# Patient Record
Sex: MALE | Race: WHITE | Employment: OTHER | URBAN - METROPOLITAN AREA
[De-identification: names, ages, dates, MRNs, and addresses within clinical notes are randomized per-mention and may not be internally consistent; named-entity substitution may affect disease eponyms.]

---

## 2017-01-16 RX ORDER — PRAVASTATIN SODIUM 40 MG
TABLET ORAL
Qty: 30 TABLET | Refills: 2 | Status: SHIPPED | OUTPATIENT
Start: 2017-01-16 | End: 2017-05-13 | Stop reason: SDUPTHER

## 2017-03-13 DIAGNOSIS — I10 ESSENTIAL HYPERTENSION: ICD-10-CM

## 2017-03-14 RX ORDER — LISINOPRIL 20 MG/1
TABLET ORAL
Qty: 30 TABLET | Refills: 2 | Status: SHIPPED | OUTPATIENT
Start: 2017-03-14 | End: 2017-06-21 | Stop reason: SDUPTHER

## 2017-04-24 ENCOUNTER — OFFICE VISIT (OUTPATIENT)
Dept: CARDIOLOGY CLINIC | Age: 62
End: 2017-04-24

## 2017-04-24 VITALS
HEIGHT: 64 IN | SYSTOLIC BLOOD PRESSURE: 128 MMHG | RESPIRATION RATE: 16 BRPM | HEART RATE: 76 BPM | BODY MASS INDEX: 26.43 KG/M2 | DIASTOLIC BLOOD PRESSURE: 64 MMHG | WEIGHT: 154.8 LBS

## 2017-04-24 DIAGNOSIS — E78.00 PURE HYPERCHOLESTEROLEMIA: ICD-10-CM

## 2017-04-24 DIAGNOSIS — I25.10 CORONARY ARTERY DISEASE INVOLVING NATIVE CORONARY ARTERY OF NATIVE HEART, ANGINA PRESENCE UNSPECIFIED: ICD-10-CM

## 2017-04-24 DIAGNOSIS — I10 ESSENTIAL HYPERTENSION: Primary | ICD-10-CM

## 2017-04-24 PROCEDURE — 99213 OFFICE O/P EST LOW 20 MIN: CPT | Performed by: INTERNAL MEDICINE

## 2017-04-25 DIAGNOSIS — I25.10 CORONARY ARTERY DISEASE INVOLVING NATIVE CORONARY ARTERY OF NATIVE HEART, ANGINA PRESENCE UNSPECIFIED: ICD-10-CM

## 2017-04-25 DIAGNOSIS — I10 ESSENTIAL HYPERTENSION: ICD-10-CM

## 2017-04-25 LAB
ALBUMIN SERPL-MCNC: 4.7 G/DL (ref 3.4–5)
ALP BLD-CCNC: 41 U/L (ref 40–129)
ALT SERPL-CCNC: 17 U/L (ref 10–40)
AST SERPL-CCNC: 28 U/L (ref 15–37)
BILIRUB SERPL-MCNC: 0.4 MG/DL (ref 0–1)
BILIRUBIN DIRECT: <0.2 MG/DL (ref 0–0.3)
BILIRUBIN, INDIRECT: NORMAL MG/DL (ref 0–1)
CHOLESTEROL, TOTAL: 144 MG/DL (ref 0–199)
HDLC SERPL-MCNC: 83 MG/DL (ref 40–60)
LDL CHOLESTEROL CALCULATED: 51 MG/DL
TOTAL CK: 83 U/L (ref 39–308)
TOTAL PROTEIN: 6.4 G/DL (ref 6.4–8.2)
TRIGL SERPL-MCNC: 48 MG/DL (ref 0–150)
VLDLC SERPL CALC-MCNC: 10 MG/DL

## 2017-05-16 RX ORDER — PRAVASTATIN SODIUM 40 MG
TABLET ORAL
Qty: 30 TABLET | Refills: 2 | Status: SHIPPED | OUTPATIENT
Start: 2017-05-16 | End: 2017-08-09 | Stop reason: SDUPTHER

## 2017-07-10 DIAGNOSIS — I10 ESSENTIAL HYPERTENSION: ICD-10-CM

## 2017-07-10 RX ORDER — LISINOPRIL 20 MG/1
TABLET ORAL
Qty: 30 TABLET | Refills: 2 | Status: SHIPPED | OUTPATIENT
Start: 2017-07-10 | End: 2017-11-10 | Stop reason: SDUPTHER

## 2017-08-09 RX ORDER — PRAVASTATIN SODIUM 40 MG
TABLET ORAL
Qty: 30 TABLET | Refills: 2 | Status: SHIPPED | OUTPATIENT
Start: 2017-08-09 | End: 2017-12-13 | Stop reason: SDUPTHER

## 2017-10-26 ENCOUNTER — OFFICE VISIT (OUTPATIENT)
Dept: CARDIOLOGY CLINIC | Age: 62
End: 2017-10-26

## 2017-10-26 VITALS
HEART RATE: 68 BPM | BODY MASS INDEX: 26.8 KG/M2 | DIASTOLIC BLOOD PRESSURE: 70 MMHG | SYSTOLIC BLOOD PRESSURE: 110 MMHG | WEIGHT: 157 LBS

## 2017-10-26 DIAGNOSIS — I51.9 LV DYSFUNCTION: ICD-10-CM

## 2017-10-26 DIAGNOSIS — I10 ESSENTIAL HYPERTENSION: ICD-10-CM

## 2017-10-26 DIAGNOSIS — I25.10 CORONARY ARTERY DISEASE INVOLVING NATIVE CORONARY ARTERY OF NATIVE HEART, ANGINA PRESENCE UNSPECIFIED: Primary | ICD-10-CM

## 2017-10-26 PROCEDURE — 1036F TOBACCO NON-USER: CPT | Performed by: INTERNAL MEDICINE

## 2017-10-26 PROCEDURE — 3017F COLORECTAL CA SCREEN DOC REV: CPT | Performed by: INTERNAL MEDICINE

## 2017-10-26 PROCEDURE — G8598 ASA/ANTIPLAT THER USED: HCPCS | Performed by: INTERNAL MEDICINE

## 2017-10-26 PROCEDURE — G8427 DOCREV CUR MEDS BY ELIG CLIN: HCPCS | Performed by: INTERNAL MEDICINE

## 2017-10-26 PROCEDURE — G8417 CALC BMI ABV UP PARAM F/U: HCPCS | Performed by: INTERNAL MEDICINE

## 2017-10-26 PROCEDURE — 99213 OFFICE O/P EST LOW 20 MIN: CPT | Performed by: INTERNAL MEDICINE

## 2017-10-26 PROCEDURE — G8484 FLU IMMUNIZE NO ADMIN: HCPCS | Performed by: INTERNAL MEDICINE

## 2017-10-26 NOTE — PROGRESS NOTES
200 MG tablet Take 200 mg by mouth every 6 hours as needed for Pain      Glucosamine-Chondroitin 500-400 MG CAPS Take 1 capsule by mouth daily.  aspirin 81 MG tablet Take 81 mg by mouth daily.  tamsulosin (FLOMAX) 0.4 MG capsule Take 0.4 mg by mouth daily. No current facility-administered medications on file prior to visit. REVIEW OF SYSTEMS:      CONSTITUTIONAL: No major weight gain or loss, fatigue, weakness, night sweats or fever. HEENT: No new vision difficulties or ringing in the ears. RESPIRATORY: No new SOB, PND, orthopnea or cough. CARDIOVASCULAR: See HPI  GI: No nausea, vomiting, diarrhea, constipation, abdominal pain or changes in bowel habits. : No urinary frequency, urgency, incontinence hematuria or dysuria. SKIN: No cyanosis or skin lesions. MUSCULOSKELETAL: No new muscle or joint pain. NEUROLOGICAL: No syncope or TIA-like symptoms. No change in sensation or strength. PSYCHIATRIC: No anxiety, pain, insomnia or depression    Objective:   PHYSICAL EXAM:        VITALS:  /70   Pulse 68   Wt 157 lb (71.2 kg)   BMI 26.80 kg/m²   CONSTITUTIONAL: Cooperative, no apparent distress, and appears well nourished / developed  NEUROLOGIC:  Awake and orientated to person, place and time. Sensation normal Motor normal.  PSYCH: Calm affect. SKIN: Warm and dry. HEENT: Sclera non-icteric, normocephalic. EOMI  Neck: Supple, no elevation of JVP, normal carotid pulses with no bruits   LUNGS:  No increased work of breathing and clear to auscultation, no crackles or wheezing  CARDIOVASCULAR:  Regular rate and rhythm with no murmurs, gallops, rubs, or abnormal heart sounds. The apical impulse not displaced  The carotid upstroke is normal in amplitude and contour without delay or bruit  JVP is not elevated  ABDOMEN:  Normal bowel sounds, non-distended and non-tender to palpation. No liver or spleen enlargement. EXT: No edema, no calf tenderness.  Pulses are present bilaterally. DATA:    Lab Results   Component Value Date    ALT 20 (L) 06/21/2017    AST 29 06/21/2017    ALKPHOS 39 06/21/2017    BILITOT 1.3 06/21/2017     Lab Results   Component Value Date    CREATININE 0.77 06/21/2017    BUN 8 (L) 06/21/2017     (L) 06/21/2017    K 4.4 06/21/2017    CL 94 (L) 06/21/2017    CO2 25 06/21/2017     Lab Results   Component Value Date    TSH 1.29 12/26/2013     Lab Results   Component Value Date    WBC 3.9 (L) 06/21/2017    HGB 14.1 06/21/2017    HCT 43.2 (L) 06/21/2017    MCV 93.9 06/21/2017     06/21/2017     No components found for: CHLPL  Lab Results   Component Value Date    TRIG 48 04/25/2017    TRIG 107 06/01/2016    TRIG 97 01/20/2016     Lab Results   Component Value Date    HDL 83 (H) 04/25/2017    HDL 76 (H) 06/01/2016    HDL 94 (H) 01/20/2016     Lab Results   Component Value Date    LDLCALC 51 04/25/2017    [address] 52 06/01/2016    LDLCALC 63 01/20/2016     Lab Results   Component Value Date    LABVLDL 10 04/25/2017    LABVLDL 21 06/01/2016    LABVLDL 19 01/20/2016       Assessment    CAD s/p stenting to LAD  Remains stable without angina    HTN  At target on meds    HLD on statin  LV dysfunction        Plan   Continue lisinopril  Continue pravastatin  Continue ASA  Continue SLN prn  Patient not on beta blocker as he experienced episodes of lightheadedness while on the medication    RTC 6 monhs    Staffed with Dr. Shashank Abdi MD  PGY 2      Staff Note      Patient seen and evaluated with the medical resident. I was physically present during the critical portions of the service when performed by the resident including the assessment and management of the patient. I agree with the findings and plans as described. He is doing well. No angina or dyspnea  Remains active.

## 2017-11-10 DIAGNOSIS — I10 ESSENTIAL HYPERTENSION: ICD-10-CM

## 2017-11-10 RX ORDER — LISINOPRIL 20 MG/1
TABLET ORAL
Qty: 30 TABLET | Refills: 0 | Status: SHIPPED | OUTPATIENT
Start: 2017-11-10 | End: 2017-12-10 | Stop reason: SDUPTHER

## 2017-12-10 DIAGNOSIS — I10 ESSENTIAL HYPERTENSION: ICD-10-CM

## 2017-12-11 RX ORDER — LISINOPRIL 20 MG/1
TABLET ORAL
Qty: 30 TABLET | Refills: 0 | Status: SHIPPED | OUTPATIENT
Start: 2017-12-11 | End: 2018-01-08 | Stop reason: SDUPTHER

## 2017-12-11 NOTE — TELEPHONE ENCOUNTER
Last ov 9/1/16  Last labs 6/21/17    Patient advised through mychart to call office and schedule f/u appointment

## 2017-12-13 RX ORDER — PRAVASTATIN SODIUM 40 MG
TABLET ORAL
Qty: 30 TABLET | Refills: 0 | Status: SHIPPED | OUTPATIENT
Start: 2017-12-13 | End: 2018-03-03 | Stop reason: SDUPTHER

## 2017-12-19 ENCOUNTER — OFFICE VISIT (OUTPATIENT)
Dept: FAMILY MEDICINE CLINIC | Age: 62
End: 2017-12-19

## 2017-12-19 VITALS
DIASTOLIC BLOOD PRESSURE: 96 MMHG | TEMPERATURE: 96.9 F | SYSTOLIC BLOOD PRESSURE: 166 MMHG | HEIGHT: 64 IN | HEART RATE: 69 BPM | OXYGEN SATURATION: 97 % | WEIGHT: 159 LBS | BODY MASS INDEX: 27.14 KG/M2

## 2017-12-19 DIAGNOSIS — I10 ESSENTIAL HYPERTENSION: ICD-10-CM

## 2017-12-19 DIAGNOSIS — J06.9 ACUTE URI: Primary | ICD-10-CM

## 2017-12-19 DIAGNOSIS — R21 SKIN RASH: ICD-10-CM

## 2017-12-19 DIAGNOSIS — R05.9 COUGH: ICD-10-CM

## 2017-12-19 DIAGNOSIS — R09.89 CHEST CONGESTION: ICD-10-CM

## 2017-12-19 PROCEDURE — G8417 CALC BMI ABV UP PARAM F/U: HCPCS | Performed by: FAMILY MEDICINE

## 2017-12-19 PROCEDURE — G8427 DOCREV CUR MEDS BY ELIG CLIN: HCPCS | Performed by: FAMILY MEDICINE

## 2017-12-19 PROCEDURE — G8598 ASA/ANTIPLAT THER USED: HCPCS | Performed by: FAMILY MEDICINE

## 2017-12-19 PROCEDURE — 1036F TOBACCO NON-USER: CPT | Performed by: FAMILY MEDICINE

## 2017-12-19 PROCEDURE — G8484 FLU IMMUNIZE NO ADMIN: HCPCS | Performed by: FAMILY MEDICINE

## 2017-12-19 PROCEDURE — 99214 OFFICE O/P EST MOD 30 MIN: CPT | Performed by: FAMILY MEDICINE

## 2017-12-19 PROCEDURE — 3017F COLORECTAL CA SCREEN DOC REV: CPT | Performed by: FAMILY MEDICINE

## 2017-12-19 RX ORDER — DOXYCYCLINE HYCLATE 100 MG
100 TABLET ORAL 2 TIMES DAILY
Qty: 20 TABLET | Refills: 0 | Status: SHIPPED | OUTPATIENT
Start: 2017-12-19 | End: 2017-12-29

## 2017-12-19 RX ORDER — PROMETHAZINE HYDROCHLORIDE AND CODEINE PHOSPHATE 6.25; 1 MG/5ML; MG/5ML
5 SYRUP ORAL 4 TIMES DAILY PRN
Qty: 150 ML | Refills: 0 | Status: SHIPPED | OUTPATIENT
Start: 2017-12-19 | End: 2018-01-09 | Stop reason: SDUPTHER

## 2017-12-19 RX ORDER — MOMETASONE FUROATE 1 MG/G
CREAM TOPICAL
Qty: 45 G | Refills: 0 | Status: SHIPPED | OUTPATIENT
Start: 2017-12-19 | End: 2021-09-20

## 2017-12-19 NOTE — PROGRESS NOTES
Here for eval of some URI sx, present for about 2 wks. Pt with sx of nasal congestion and sinus pressure, but with some increased chest congestion. Pt cough is moderate, usually nonproductive. Cough is present day and night, is a hacking cough. Pt can feel rattling in the chest.  Pt does not have any shortness of breath, no chest pain. Pt using over the counter dayquil and nyquil prn. Has not had fever, that he is aware of. Pt has not had any significant sick contacts, noone with similar sx. Pt noted rash that started at the same time, with some irritation to bilateral upper extremities, bilateral lower extremities. Moderate itchiness. Pt is treating with topical lotions but not helpful. Pt does feel that sx have shown up about same time as the URI sx. Sx are not necessarily changing, may be getting better. Pt here for follow up of blood pressure. Pt states doing great with adherence to therapy and feels well. No issues of chest pain, shortness of breath. No vision changes, headache, swelling in legs. .  Pt does tend to track blood pressure irregularly. Did miss blood pressure meds today. Except as noted above in the history of present illness, the review of systems is  negative for headache, vision changes, chest pain, shortness of breath, abdominal pain, urinary sx, bowel changes. Past medical, surgical, and social history reviewed and updated  Medications and allergies reviewed and updated         O: BP (!) 166/96   Pulse 69   Temp 96.9 °F (36.1 °C)   Ht 5' 3.75\" (1.619 m)   Wt 159 lb (72.1 kg)   SpO2 97%   BMI 27.51 kg/m²   GEN: No acute distress, cooperative, well nourished, alert. HEENT: PEERLA, EOMI , normocephalic/atraumatic, nares and oropharynx clear. Mucus membranes normal, Tympanic membranes clear bilaterally. Moderate nasal congestion bilaterally, mild postnasal drip in posterior oropharynx  No sinus tender to palpation bilaterally.     Neck: soft, supple, no thyromegaly, mass, no Lymphadenopathy  CV: Regular rate and rhythm, no murmur, rubs, gallops. No edema. Resp: good air entry bilaterally. Mild diffuse coarse breath sounds, no E-A changes. Improved with cough with some persistent sx  Skin: mild raised minimally irritated inflamed rash to bilateral upper extremities, ACW. Discrete lesions w/o discharge, no vesicles, weeping/discharge        Current Outpatient Prescriptions   Medication Sig Dispense Refill    doxycycline hyclate (VIBRA-TABS) 100 MG tablet Take 1 tablet by mouth 2 times daily for 10 days 20 tablet 0    promethazine-codeine (PHENERGAN WITH CODEINE) 6.25-10 MG/5ML syrup Take 5 mLs by mouth 4 times daily as needed for Cough . 150 mL 0    mometasone (ELOCON) 0.1 % cream Apply topically twice daily. 45 g 0    pravastatin (PRAVACHOL) 40 MG tablet TAKE 1 TABLET BY MOUTH EVERY EVENING. 30 tablet 0    lisinopril (PRINIVIL;ZESTRIL) 20 MG tablet TAKE 1 TABLET BY MOUTH DAILY 30 tablet 0    nitroGLYCERIN (NITROSTAT) 0.4 MG SL tablet Place 1 tablet under the tongue every 5 minutes as needed for Chest pain 25 tablet 1    ibuprofen (ADVIL;MOTRIN) 200 MG tablet Take 200 mg by mouth every 6 hours as needed for Pain      aspirin 81 MG tablet Take 81 mg by mouth daily.  tamsulosin (FLOMAX) 0.4 MG capsule Take 0.4 mg by mouth daily.  Glucosamine-Chondroitin 500-400 MG CAPS Take 1 capsule by mouth daily. No current facility-administered medications for this visit. ASSESSMENT / PLAN:    1. Acute URI  Persistent sx, c/w persistent persistent bronchitis  tx with doxy as below  Cont over the counter/symptomatic treatment. Follow up for persistent symptoms in 7 to 10 days or sooner for worsening symptomatology   - doxycycline hyclate (VIBRA-TABS) 100 MG tablet; Take 1 tablet by mouth 2 times daily for 10 days  Dispense: 20 tablet; Refill: 0    2.  Essential hypertension  blood pressure elevated, states missed blood pressure meds over the

## 2018-01-08 DIAGNOSIS — I10 ESSENTIAL HYPERTENSION: ICD-10-CM

## 2018-01-08 RX ORDER — LISINOPRIL 20 MG/1
TABLET ORAL
Qty: 30 TABLET | Refills: 0 | Status: SHIPPED | OUTPATIENT
Start: 2018-01-08 | End: 2018-01-29 | Stop reason: DRUGHIGH

## 2018-01-09 ENCOUNTER — OFFICE VISIT (OUTPATIENT)
Dept: FAMILY MEDICINE CLINIC | Age: 63
End: 2018-01-09

## 2018-01-09 VITALS
RESPIRATION RATE: 18 BRPM | WEIGHT: 158 LBS | BODY MASS INDEX: 27.33 KG/M2 | SYSTOLIC BLOOD PRESSURE: 156 MMHG | OXYGEN SATURATION: 97 % | HEART RATE: 68 BPM | DIASTOLIC BLOOD PRESSURE: 84 MMHG

## 2018-01-09 DIAGNOSIS — J20.9 BRONCHITIS, ACUTE, WITH BRONCHOSPASM: Primary | ICD-10-CM

## 2018-01-09 DIAGNOSIS — I10 ESSENTIAL HYPERTENSION: ICD-10-CM

## 2018-01-09 DIAGNOSIS — R05.9 COUGH: ICD-10-CM

## 2018-01-09 PROCEDURE — 99214 OFFICE O/P EST MOD 30 MIN: CPT | Performed by: FAMILY MEDICINE

## 2018-01-09 PROCEDURE — G8484 FLU IMMUNIZE NO ADMIN: HCPCS | Performed by: FAMILY MEDICINE

## 2018-01-09 PROCEDURE — G8598 ASA/ANTIPLAT THER USED: HCPCS | Performed by: FAMILY MEDICINE

## 2018-01-09 PROCEDURE — 3017F COLORECTAL CA SCREEN DOC REV: CPT | Performed by: FAMILY MEDICINE

## 2018-01-09 PROCEDURE — G8417 CALC BMI ABV UP PARAM F/U: HCPCS | Performed by: FAMILY MEDICINE

## 2018-01-09 PROCEDURE — G8427 DOCREV CUR MEDS BY ELIG CLIN: HCPCS | Performed by: FAMILY MEDICINE

## 2018-01-09 PROCEDURE — 1036F TOBACCO NON-USER: CPT | Performed by: FAMILY MEDICINE

## 2018-01-09 RX ORDER — PROMETHAZINE HYDROCHLORIDE AND CODEINE PHOSPHATE 6.25; 1 MG/5ML; MG/5ML
5 SYRUP ORAL 2 TIMES DAILY PRN
Qty: 118 ML | Refills: 0 | Status: SHIPPED | OUTPATIENT
Start: 2018-01-09 | End: 2018-01-16

## 2018-01-09 RX ORDER — ALBUTEROL SULFATE 90 UG/1
2 AEROSOL, METERED RESPIRATORY (INHALATION) EVERY 4 HOURS PRN
Qty: 1 INHALER | Refills: 3 | Status: SHIPPED | OUTPATIENT
Start: 2018-01-09 | End: 2018-01-29

## 2018-01-09 NOTE — PATIENT INSTRUCTIONS
1) Track your blood pressure 2 to 3 times a week. Send Writer[employer] message with readings in 10-14 days from now.

## 2018-01-14 NOTE — PROGRESS NOTES
77    Cancer Maternal Grandmother 92     esophageal    Heart Attack Maternal Grandfather     Seizures Sister     Mental Illness Brother      Social History     Social History    Marital status:      Spouse name: N/A    Number of children: N/A    Years of education: N/A     Occupational History    Not on file. Social History Main Topics    Smoking status: Never Smoker    Smokeless tobacco: Never Used    Alcohol use 4.8 oz/week     8 Cans of beer per week    Drug use: No    Sexual activity: Yes     Partners: Female     Other Topics Concern    Not on file     Social History Narrative    No narrative on file       O: BP (!) 156/84   Pulse 68   Resp 18   Wt 158 lb (71.7 kg)   SpO2 97%   BMI 27.33 kg/m²   Physical Exam  GEN: No acute distress, cooperative, well nourished, alert. HEENT: PEERLA, EOMI , normocephalic/atraumatic, nares and oropharynx clear. Mucus membranes normal, Tympanic membranes clear bilaterally. Neck: soft, supple, no thyromegaly, mass, no Lymphadenopathy  CV: Regular rate and rhythm, no murmur, rubs, gallops. No edema. Resp: Clear to auscultation bilaterally good air entry bilaterally  No crackles, but I auscultate scattered upper lung wheeze. Breathing comfortably. Psych: normal affect. Neuro: AOx3      ASSESSMENT  1. Bronchitis, acute, with bronchospasm  XR CHEST STANDARD (2 VW)    albuterol sulfate HFA (PROAIR HFA) 108 (90 Base) MCG/ACT inhaler   2. Cough  promethazine-codeine (PHENERGAN WITH CODEINE) 6.25-10 MG/5ML syrup    XR CHEST STANDARD (2 VW)    albuterol sulfate HFA (PROAIR HFA) 108 (90 Base) MCG/ACT inhaler   3. Essential hypertension       #1:   The risks, benefits, potential side effects and barriers to medication use were addressed today. Understanding was acknowledged. Patient asked to follow-up if condition(s) do not improve as anticipated. Advised to obtain CXR if no improvement in 2 to 3 days.      PLAN          See rest of plan under

## 2018-01-29 ENCOUNTER — OFFICE VISIT (OUTPATIENT)
Dept: FAMILY MEDICINE CLINIC | Age: 63
End: 2018-01-29

## 2018-01-29 VITALS
HEART RATE: 74 BPM | OXYGEN SATURATION: 97 % | HEIGHT: 64 IN | BODY MASS INDEX: 26.8 KG/M2 | DIASTOLIC BLOOD PRESSURE: 93 MMHG | WEIGHT: 157 LBS | SYSTOLIC BLOOD PRESSURE: 169 MMHG

## 2018-01-29 DIAGNOSIS — I10 ESSENTIAL HYPERTENSION: Primary | ICD-10-CM

## 2018-01-29 DIAGNOSIS — I25.10 CORONARY ARTERY DISEASE INVOLVING NATIVE CORONARY ARTERY OF NATIVE HEART, ANGINA PRESENCE UNSPECIFIED: ICD-10-CM

## 2018-01-29 DIAGNOSIS — E78.00 PURE HYPERCHOLESTEROLEMIA: ICD-10-CM

## 2018-01-29 DIAGNOSIS — E83.110 HEREDITARY HEMOCHROMATOSIS (HCC): ICD-10-CM

## 2018-01-29 DIAGNOSIS — R73.01 ELEVATED FASTING GLUCOSE: ICD-10-CM

## 2018-01-29 PROCEDURE — G8598 ASA/ANTIPLAT THER USED: HCPCS | Performed by: FAMILY MEDICINE

## 2018-01-29 PROCEDURE — 1036F TOBACCO NON-USER: CPT | Performed by: FAMILY MEDICINE

## 2018-01-29 PROCEDURE — 3017F COLORECTAL CA SCREEN DOC REV: CPT | Performed by: FAMILY MEDICINE

## 2018-01-29 PROCEDURE — 99214 OFFICE O/P EST MOD 30 MIN: CPT | Performed by: FAMILY MEDICINE

## 2018-01-29 PROCEDURE — G8427 DOCREV CUR MEDS BY ELIG CLIN: HCPCS | Performed by: FAMILY MEDICINE

## 2018-01-29 PROCEDURE — G8484 FLU IMMUNIZE NO ADMIN: HCPCS | Performed by: FAMILY MEDICINE

## 2018-01-29 PROCEDURE — G8417 CALC BMI ABV UP PARAM F/U: HCPCS | Performed by: FAMILY MEDICINE

## 2018-01-29 RX ORDER — LISINOPRIL 40 MG/1
40 TABLET ORAL DAILY
Qty: 30 TABLET | Refills: 1 | Status: SHIPPED | OUTPATIENT
Start: 2018-01-29 | End: 2018-02-21 | Stop reason: ALTCHOICE

## 2018-01-29 NOTE — PROGRESS NOTES
cholesterol diet. Hereditary hemochromatosis ([facility] [address]  -     CBC; Future  Elevated fasting glucose  -     Comprehensive Metabolic Panel; Future        -     Recommended dietary/ lifestyle modifications, including decreased concentrated sweets/ carbs. Follow up 4 -6 weeks/ prn.

## 2018-02-05 DIAGNOSIS — I10 ESSENTIAL HYPERTENSION: ICD-10-CM

## 2018-02-05 RX ORDER — LISINOPRIL 20 MG/1
TABLET ORAL
Qty: 30 TABLET | Refills: 0 | Status: SHIPPED | OUTPATIENT
Start: 2018-02-05 | End: 2018-02-21 | Stop reason: ALTCHOICE

## 2018-02-07 DIAGNOSIS — E78.00 PURE HYPERCHOLESTEROLEMIA: ICD-10-CM

## 2018-02-07 DIAGNOSIS — D72.819 LEUKOPENIA, UNSPECIFIED TYPE: Primary | ICD-10-CM

## 2018-02-07 DIAGNOSIS — I10 ESSENTIAL HYPERTENSION: ICD-10-CM

## 2018-02-07 DIAGNOSIS — E83.110 HEREDITARY HEMOCHROMATOSIS (HCC): ICD-10-CM

## 2018-02-07 DIAGNOSIS — R73.01 ELEVATED FASTING GLUCOSE: ICD-10-CM

## 2018-02-07 LAB
A/G RATIO: 2.4 (ref 1.1–2.2)
ALBUMIN SERPL-MCNC: 4.7 G/DL (ref 3.4–5)
ALP BLD-CCNC: 44 U/L (ref 40–129)
ALT SERPL-CCNC: 26 U/L (ref 10–40)
ANION GAP SERPL CALCULATED.3IONS-SCNC: 13 MMOL/L (ref 3–16)
AST SERPL-CCNC: 41 U/L (ref 15–37)
BILIRUB SERPL-MCNC: 0.6 MG/DL (ref 0–1)
BUN BLDV-MCNC: 5 MG/DL (ref 7–20)
CALCIUM SERPL-MCNC: 9.1 MG/DL (ref 8.3–10.6)
CHLORIDE BLD-SCNC: 92 MMOL/L (ref 99–110)
CHOLESTEROL, TOTAL: 141 MG/DL (ref 0–199)
CO2: 28 MMOL/L (ref 21–32)
CREAT SERPL-MCNC: <0.5 MG/DL (ref 0.8–1.3)
GFR AFRICAN AMERICAN: >60
GFR NON-AFRICAN AMERICAN: >60
GLOBULIN: 2 G/DL
GLUCOSE BLD-MCNC: 89 MG/DL (ref 70–99)
HCT VFR BLD CALC: 44.1 % (ref 40.5–52.5)
HDLC SERPL-MCNC: 65 MG/DL (ref 40–60)
HEMOGLOBIN: 15.5 G/DL (ref 13.5–17.5)
LDL CHOLESTEROL CALCULATED: 58 MG/DL
MCH RBC QN AUTO: 33.4 PG (ref 26–34)
MCHC RBC AUTO-ENTMCNC: 35.1 G/DL (ref 31–36)
MCV RBC AUTO: 95.2 FL (ref 80–100)
PDW BLD-RTO: 13.9 % (ref 12.4–15.4)
PLATELET # BLD: 175 K/UL (ref 135–450)
PMV BLD AUTO: 7.8 FL (ref 5–10.5)
POTASSIUM SERPL-SCNC: 4.7 MMOL/L (ref 3.5–5.1)
RBC # BLD: 4.63 M/UL (ref 4.2–5.9)
SODIUM BLD-SCNC: 133 MMOL/L (ref 136–145)
TOTAL PROTEIN: 6.7 G/DL (ref 6.4–8.2)
TRIGL SERPL-MCNC: 88 MG/DL (ref 0–150)
VLDLC SERPL CALC-MCNC: 18 MG/DL
WBC # BLD: 2.5 K/UL (ref 4–11)

## 2018-02-20 ENCOUNTER — TELEPHONE (OUTPATIENT)
Dept: FAMILY MEDICINE CLINIC | Age: 63
End: 2018-02-20

## 2018-02-21 ENCOUNTER — OFFICE VISIT (OUTPATIENT)
Dept: FAMILY MEDICINE CLINIC | Age: 63
End: 2018-02-21

## 2018-02-21 VITALS
BODY MASS INDEX: 26.77 KG/M2 | HEART RATE: 69 BPM | WEIGHT: 156.8 LBS | OXYGEN SATURATION: 96 % | HEIGHT: 64 IN | SYSTOLIC BLOOD PRESSURE: 138 MMHG | RESPIRATION RATE: 18 BRPM | TEMPERATURE: 97.5 F | DIASTOLIC BLOOD PRESSURE: 83 MMHG

## 2018-02-21 DIAGNOSIS — R05.9 COUGH: ICD-10-CM

## 2018-02-21 DIAGNOSIS — I10 ESSENTIAL HYPERTENSION: Primary | ICD-10-CM

## 2018-02-21 DIAGNOSIS — J06.9 UPPER RESPIRATORY TRACT INFECTION, UNSPECIFIED TYPE: ICD-10-CM

## 2018-02-21 PROCEDURE — 1036F TOBACCO NON-USER: CPT | Performed by: FAMILY MEDICINE

## 2018-02-21 PROCEDURE — G8598 ASA/ANTIPLAT THER USED: HCPCS | Performed by: FAMILY MEDICINE

## 2018-02-21 PROCEDURE — 99214 OFFICE O/P EST MOD 30 MIN: CPT | Performed by: FAMILY MEDICINE

## 2018-02-21 PROCEDURE — G8484 FLU IMMUNIZE NO ADMIN: HCPCS | Performed by: FAMILY MEDICINE

## 2018-02-21 PROCEDURE — 3017F COLORECTAL CA SCREEN DOC REV: CPT | Performed by: FAMILY MEDICINE

## 2018-02-21 PROCEDURE — G8427 DOCREV CUR MEDS BY ELIG CLIN: HCPCS | Performed by: FAMILY MEDICINE

## 2018-02-21 PROCEDURE — G8417 CALC BMI ABV UP PARAM F/U: HCPCS | Performed by: FAMILY MEDICINE

## 2018-02-21 RX ORDER — AZITHROMYCIN 250 MG/1
TABLET, FILM COATED ORAL
Qty: 1 PACKET | Refills: 0 | Status: SHIPPED | OUTPATIENT
Start: 2018-02-21 | End: 2020-09-17 | Stop reason: ALTCHOICE

## 2018-02-21 RX ORDER — BENZONATATE 200 MG/1
200 CAPSULE ORAL 3 TIMES DAILY PRN
Qty: 30 CAPSULE | Refills: 0 | Status: SHIPPED | OUTPATIENT
Start: 2018-02-21 | End: 2018-02-28

## 2018-02-21 RX ORDER — LOSARTAN POTASSIUM 100 MG/1
100 TABLET ORAL DAILY
Qty: 30 TABLET | Refills: 1 | Status: SHIPPED | OUTPATIENT
Start: 2018-02-21 | End: 2020-09-17 | Stop reason: SDUPTHER

## 2018-02-21 ASSESSMENT — PATIENT HEALTH QUESTIONNAIRE - PHQ9
1. LITTLE INTEREST OR PLEASURE IN DOING THINGS: 0
SUM OF ALL RESPONSES TO PHQ QUESTIONS 1-9: 0
2. FEELING DOWN, DEPRESSED OR HOPELESS: 0
SUM OF ALL RESPONSES TO PHQ9 QUESTIONS 1 & 2: 0

## 2018-02-21 NOTE — PROGRESS NOTES
Patient is here for follow up of hypertension. He brought his blood pressure cuff in and was 135/80 in our office today. At home his blood pressure is usually 120-130s/ 70- 80s. Has not exercised as much this week. 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 Some right sided chest pain X 5 days. No injury recalled. Cough started 3-4 days ago. He had coughing at end of 1/18- early 2/18. He thinks the cough has been off and on ,but reflecting and maybe it has always been there. Sleeping difficulties last night due to cough. Fever started yesterday afternoon. Some post nasal drip . Minimal nasal congestion . No headaches. No nausea, vomiting, diarrhea . No swelling to tongue or lips. Denies chest pain . ROS: All other systems were reviewed and are negative . Patient's allergies and medications were reviewed. Patient's past medical, surgical, social , and family history were reviewed. OBJECTIVE:  /83   Pulse 69   Temp 97.5 °F (36.4 °C)   Resp 18   Ht 5' 4\" (1.626 m)   Wt 156 lb 12.8 oz (71.1 kg)   SpO2 96%   BMI 26.91 kg/m²   General: NAD, cooperative, alert and oriented X 3. Mood / affect is good. good insight. well hydrated. HEENT: PERRLA, EOMI, TMs - clear. Nasopharynx clear. Neck : no lymphadenopathy, supple, FROM  CV: Regular rate and rhythm , no murmurs/ rub/ gallop. No edema. Lungs : CTA bilaterally, breathing comfortably  Abdomen: positive bowel sounds, soft , non tender, non distended. No hepatosplenomegaly. No CVA tenderness. Skin: no rashes. Non tender. ASSESSMENT/  PLAN:  Emelina Miner was seen today for congestion. Diagnoses and all orders for this visit:    Essential hypertension  -     Change to Losartan (COZAAR) 100 MG tablet; Take 1 tablet by mouth daily. Stop Lisinopril due to cough. -     Renal Function Panel; Future        -     Follow low sodium diet. Cough        -     Likely URI and Lisinopril contributing.   Monitor off Lisinopril and after Zpak. Upper respiratory tract infection, unspecified type  -     Azithromycin (ZITHROMAX) 250 MG tablet; Take 2 tabs (500 mg) on Day 1, and take 1 tab (250 mg) on days 2 through 5.  -     Benzonatate (TESSALON) 200 MG capsule; Take 1 capsule by mouth 3 times daily as needed for Cough        -     Push fluids - water. Follow up if no improvement 7-10d/ prn increased symptoms/ otherwise follow up in  4 -6 weeks/ prn.

## 2018-03-05 RX ORDER — PRAVASTATIN SODIUM 40 MG
TABLET ORAL
Qty: 30 TABLET | Refills: 5 | Status: SHIPPED | OUTPATIENT
Start: 2018-03-05 | End: 2018-08-07 | Stop reason: SDUPTHER

## 2018-03-12 ENCOUNTER — OFFICE VISIT (OUTPATIENT)
Dept: FAMILY MEDICINE CLINIC | Age: 63
End: 2018-03-12

## 2018-03-12 VITALS
DIASTOLIC BLOOD PRESSURE: 95 MMHG | HEART RATE: 69 BPM | OXYGEN SATURATION: 99 % | WEIGHT: 158 LBS | BODY MASS INDEX: 27.12 KG/M2 | RESPIRATION RATE: 16 BRPM | SYSTOLIC BLOOD PRESSURE: 175 MMHG

## 2018-03-12 DIAGNOSIS — Z23 NEED FOR VACCINE FOR DT (DIPHTHERIA-TETANUS): ICD-10-CM

## 2018-03-12 DIAGNOSIS — I10 ESSENTIAL HYPERTENSION: Primary | ICD-10-CM

## 2018-03-12 DIAGNOSIS — R73.01 ELEVATED FASTING GLUCOSE: ICD-10-CM

## 2018-03-12 DIAGNOSIS — L30.9 ECZEMA, UNSPECIFIED TYPE: ICD-10-CM

## 2018-03-12 DIAGNOSIS — I25.10 CORONARY ARTERY DISEASE INVOLVING NATIVE CORONARY ARTERY OF NATIVE HEART, ANGINA PRESENCE UNSPECIFIED: ICD-10-CM

## 2018-03-12 PROCEDURE — 99214 OFFICE O/P EST MOD 30 MIN: CPT | Performed by: FAMILY MEDICINE

## 2018-03-12 PROCEDURE — G8598 ASA/ANTIPLAT THER USED: HCPCS | Performed by: FAMILY MEDICINE

## 2018-03-12 PROCEDURE — 1036F TOBACCO NON-USER: CPT | Performed by: FAMILY MEDICINE

## 2018-03-12 PROCEDURE — 90714 TD VACC NO PRESV 7 YRS+ IM: CPT | Performed by: FAMILY MEDICINE

## 2018-03-12 PROCEDURE — 3017F COLORECTAL CA SCREEN DOC REV: CPT | Performed by: FAMILY MEDICINE

## 2018-03-12 PROCEDURE — G8417 CALC BMI ABV UP PARAM F/U: HCPCS | Performed by: FAMILY MEDICINE

## 2018-03-12 PROCEDURE — 90471 IMMUNIZATION ADMIN: CPT | Performed by: FAMILY MEDICINE

## 2018-03-12 PROCEDURE — G8484 FLU IMMUNIZE NO ADMIN: HCPCS | Performed by: FAMILY MEDICINE

## 2018-03-12 PROCEDURE — G8427 DOCREV CUR MEDS BY ELIG CLIN: HCPCS | Performed by: FAMILY MEDICINE

## 2018-03-12 RX ORDER — TETANUS AND DIPHTHERIA TOXOIDS ADSORBED 2; 2 [LF]/.5ML; [LF]/.5ML
0.5 INJECTION INTRAMUSCULAR ONCE
Qty: 0.5 ML | Refills: 0 | Status: CANCELLED | OUTPATIENT
Start: 2018-03-12 | End: 2018-03-12

## 2018-03-12 RX ORDER — LOSARTAN POTASSIUM AND HYDROCHLOROTHIAZIDE 25; 100 MG/1; MG/1
1 TABLET ORAL DAILY
Qty: 30 TABLET | Refills: 1 | Status: SHIPPED | OUTPATIENT
Start: 2018-03-12 | End: 2018-04-18 | Stop reason: SDUPTHER

## 2018-03-23 DIAGNOSIS — I10 ESSENTIAL HYPERTENSION: ICD-10-CM

## 2018-03-23 RX ORDER — LISINOPRIL 40 MG/1
40 TABLET ORAL DAILY
Qty: 30 TABLET | Refills: 1 | OUTPATIENT
Start: 2018-03-23

## 2018-04-02 DIAGNOSIS — E87.1 HYPONATREMIA: Primary | ICD-10-CM

## 2018-04-02 DIAGNOSIS — I10 ESSENTIAL HYPERTENSION: ICD-10-CM

## 2018-04-02 LAB
ALBUMIN SERPL-MCNC: 4.8 G/DL (ref 3.4–5)
ANION GAP SERPL CALCULATED.3IONS-SCNC: 14 MMOL/L (ref 3–16)
BUN BLDV-MCNC: 8 MG/DL (ref 7–20)
CALCIUM SERPL-MCNC: 9.3 MG/DL (ref 8.3–10.6)
CHLORIDE BLD-SCNC: 86 MMOL/L (ref 99–110)
CO2: 27 MMOL/L (ref 21–32)
CREAT SERPL-MCNC: 0.5 MG/DL (ref 0.8–1.3)
GFR AFRICAN AMERICAN: >60
GFR NON-AFRICAN AMERICAN: >60
GLUCOSE BLD-MCNC: 88 MG/DL (ref 70–99)
PHOSPHORUS: 3.8 MG/DL (ref 2.5–4.9)
POTASSIUM SERPL-SCNC: 4.5 MMOL/L (ref 3.5–5.1)
SODIUM BLD-SCNC: 127 MMOL/L (ref 136–145)

## 2018-04-02 RX ORDER — METOPROLOL SUCCINATE 50 MG/1
50 TABLET, EXTENDED RELEASE ORAL DAILY
Qty: 30 TABLET | Refills: 1 | Status: SHIPPED | OUTPATIENT
Start: 2018-04-02 | End: 2018-04-06 | Stop reason: SINTOL

## 2018-04-04 ENCOUNTER — PATIENT MESSAGE (OUTPATIENT)
Dept: FAMILY MEDICINE CLINIC | Age: 63
End: 2018-04-04

## 2018-04-18 ENCOUNTER — OFFICE VISIT (OUTPATIENT)
Dept: FAMILY MEDICINE CLINIC | Age: 63
End: 2018-04-18

## 2018-04-18 VITALS
HEART RATE: 64 BPM | OXYGEN SATURATION: 98 % | BODY MASS INDEX: 26.74 KG/M2 | DIASTOLIC BLOOD PRESSURE: 87 MMHG | RESPIRATION RATE: 16 BRPM | WEIGHT: 155.8 LBS | SYSTOLIC BLOOD PRESSURE: 145 MMHG | TEMPERATURE: 97.5 F

## 2018-04-18 DIAGNOSIS — E87.1 HYPONATREMIA: ICD-10-CM

## 2018-04-18 DIAGNOSIS — I25.10 CORONARY ARTERY DISEASE INVOLVING NATIVE CORONARY ARTERY OF NATIVE HEART, ANGINA PRESENCE UNSPECIFIED: ICD-10-CM

## 2018-04-18 DIAGNOSIS — I10 ESSENTIAL HYPERTENSION: Primary | ICD-10-CM

## 2018-04-18 PROCEDURE — G8417 CALC BMI ABV UP PARAM F/U: HCPCS | Performed by: FAMILY MEDICINE

## 2018-04-18 PROCEDURE — 1036F TOBACCO NON-USER: CPT | Performed by: FAMILY MEDICINE

## 2018-04-18 PROCEDURE — 99214 OFFICE O/P EST MOD 30 MIN: CPT | Performed by: FAMILY MEDICINE

## 2018-04-18 PROCEDURE — 3017F COLORECTAL CA SCREEN DOC REV: CPT | Performed by: FAMILY MEDICINE

## 2018-04-18 PROCEDURE — G8427 DOCREV CUR MEDS BY ELIG CLIN: HCPCS | Performed by: FAMILY MEDICINE

## 2018-04-18 PROCEDURE — G8598 ASA/ANTIPLAT THER USED: HCPCS | Performed by: FAMILY MEDICINE

## 2018-04-18 RX ORDER — LOSARTAN POTASSIUM AND HYDROCHLOROTHIAZIDE 25; 100 MG/1; MG/1
1 TABLET ORAL DAILY
Qty: 30 TABLET | Refills: 1 | Status: SHIPPED | OUTPATIENT
Start: 2018-04-18 | End: 2018-05-16 | Stop reason: SDUPTHER

## 2018-04-18 RX ORDER — AMLODIPINE BESYLATE 2.5 MG/1
2.5 TABLET ORAL DAILY
Qty: 30 TABLET | Refills: 1 | Status: SHIPPED | OUTPATIENT
Start: 2018-04-18 | End: 2018-05-16 | Stop reason: DRUGHIGH

## 2018-05-02 DIAGNOSIS — E87.1 HYPONATREMIA: ICD-10-CM

## 2018-05-02 DIAGNOSIS — I10 ESSENTIAL HYPERTENSION: ICD-10-CM

## 2018-05-02 LAB
ALBUMIN SERPL-MCNC: 4.8 G/DL (ref 3.4–5)
ANION GAP SERPL CALCULATED.3IONS-SCNC: 16 MMOL/L (ref 3–16)
BUN BLDV-MCNC: 5 MG/DL (ref 7–20)
CALCIUM SERPL-MCNC: 9.2 MG/DL (ref 8.3–10.6)
CHLORIDE BLD-SCNC: 86 MMOL/L (ref 99–110)
CO2: 26 MMOL/L (ref 21–32)
CREAT SERPL-MCNC: 0.6 MG/DL (ref 0.8–1.3)
GFR AFRICAN AMERICAN: >60
GFR NON-AFRICAN AMERICAN: >60
GLUCOSE BLD-MCNC: 93 MG/DL (ref 70–99)
PHOSPHORUS: 2.9 MG/DL (ref 2.5–4.9)
POTASSIUM SERPL-SCNC: 3.9 MMOL/L (ref 3.5–5.1)
SODIUM BLD-SCNC: 128 MMOL/L (ref 136–145)

## 2018-05-16 ENCOUNTER — OFFICE VISIT (OUTPATIENT)
Dept: FAMILY MEDICINE CLINIC | Age: 63
End: 2018-05-16

## 2018-05-16 VITALS
HEIGHT: 63 IN | OXYGEN SATURATION: 99 % | RESPIRATION RATE: 12 BRPM | TEMPERATURE: 97.5 F | SYSTOLIC BLOOD PRESSURE: 136 MMHG | DIASTOLIC BLOOD PRESSURE: 72 MMHG | WEIGHT: 157.4 LBS | HEART RATE: 69 BPM | BODY MASS INDEX: 27.89 KG/M2

## 2018-05-16 DIAGNOSIS — I10 ESSENTIAL HYPERTENSION: Primary | ICD-10-CM

## 2018-05-16 DIAGNOSIS — I25.10 CORONARY ARTERY DISEASE INVOLVING NATIVE CORONARY ARTERY OF NATIVE HEART, ANGINA PRESENCE UNSPECIFIED: ICD-10-CM

## 2018-05-16 PROCEDURE — G8598 ASA/ANTIPLAT THER USED: HCPCS | Performed by: FAMILY MEDICINE

## 2018-05-16 PROCEDURE — G8427 DOCREV CUR MEDS BY ELIG CLIN: HCPCS | Performed by: FAMILY MEDICINE

## 2018-05-16 PROCEDURE — G8417 CALC BMI ABV UP PARAM F/U: HCPCS | Performed by: FAMILY MEDICINE

## 2018-05-16 PROCEDURE — 99214 OFFICE O/P EST MOD 30 MIN: CPT | Performed by: FAMILY MEDICINE

## 2018-05-16 PROCEDURE — 1036F TOBACCO NON-USER: CPT | Performed by: FAMILY MEDICINE

## 2018-05-16 PROCEDURE — 3017F COLORECTAL CA SCREEN DOC REV: CPT | Performed by: FAMILY MEDICINE

## 2018-05-16 RX ORDER — AMLODIPINE BESYLATE 5 MG/1
5 TABLET ORAL DAILY
Qty: 30 TABLET | Refills: 1 | Status: SHIPPED | OUTPATIENT
Start: 2018-05-16 | End: 2018-07-08 | Stop reason: SDUPTHER

## 2018-05-16 RX ORDER — LOSARTAN POTASSIUM AND HYDROCHLOROTHIAZIDE 25; 100 MG/1; MG/1
1 TABLET ORAL DAILY
Qty: 90 TABLET | Refills: 1 | Status: SHIPPED | OUTPATIENT
Start: 2018-05-16 | End: 2018-12-13 | Stop reason: SDUPTHER

## 2018-05-16 RX ORDER — AMLODIPINE BESYLATE 2.5 MG/1
2.5 TABLET ORAL DAILY
Qty: 90 TABLET | Refills: 1 | Status: CANCELLED | OUTPATIENT
Start: 2018-05-16

## 2018-07-08 DIAGNOSIS — I10 ESSENTIAL HYPERTENSION: ICD-10-CM

## 2018-07-09 RX ORDER — AMLODIPINE BESYLATE 5 MG/1
TABLET ORAL
Qty: 30 TABLET | Refills: 4 | Status: SHIPPED | OUTPATIENT
Start: 2018-07-09 | End: 2020-09-17 | Stop reason: SINTOL

## 2018-08-07 RX ORDER — PRAVASTATIN SODIUM 40 MG
40 TABLET ORAL DAILY
Qty: 30 TABLET | Refills: 1 | Status: SHIPPED | OUTPATIENT
Start: 2018-08-07 | End: 2018-10-01 | Stop reason: SDUPTHER

## 2018-08-20 DIAGNOSIS — L30.9 ECZEMA, UNSPECIFIED TYPE: ICD-10-CM

## 2018-09-06 ENCOUNTER — HOSPITAL ENCOUNTER (OUTPATIENT)
Dept: GENERAL RADIOLOGY | Age: 63
Discharge: HOME | End: 2018-09-06
Payer: COMMERCIAL

## 2018-09-06 ENCOUNTER — OFFICE VISIT (OUTPATIENT)
Dept: FAMILY MEDICINE CLINIC | Age: 63
End: 2018-09-06

## 2018-09-06 ENCOUNTER — HOSPITAL ENCOUNTER (OUTPATIENT)
Age: 63
Discharge: HOME | End: 2018-09-06
Payer: COMMERCIAL

## 2018-09-06 VITALS
BODY MASS INDEX: 27.55 KG/M2 | OXYGEN SATURATION: 98 % | DIASTOLIC BLOOD PRESSURE: 80 MMHG | HEART RATE: 72 BPM | WEIGHT: 161.4 LBS | SYSTOLIC BLOOD PRESSURE: 158 MMHG | HEIGHT: 64 IN | TEMPERATURE: 97.3 F | RESPIRATION RATE: 12 BRPM

## 2018-09-06 DIAGNOSIS — I10 ESSENTIAL HYPERTENSION: ICD-10-CM

## 2018-09-06 DIAGNOSIS — R06.2 WHEEZING: ICD-10-CM

## 2018-09-06 DIAGNOSIS — R06.02 SOB (SHORTNESS OF BREATH): ICD-10-CM

## 2018-09-06 DIAGNOSIS — R05.9 COUGH: ICD-10-CM

## 2018-09-06 DIAGNOSIS — I25.10 CORONARY ARTERY DISEASE INVOLVING NATIVE CORONARY ARTERY OF NATIVE HEART, ANGINA PRESENCE UNSPECIFIED: ICD-10-CM

## 2018-09-06 DIAGNOSIS — J20.9 ACUTE BRONCHITIS, UNSPECIFIED ORGANISM: Primary | ICD-10-CM

## 2018-09-06 DIAGNOSIS — J20.9 ACUTE BRONCHITIS, UNSPECIFIED ORGANISM: ICD-10-CM

## 2018-09-06 PROCEDURE — 3017F COLORECTAL CA SCREEN DOC REV: CPT | Performed by: FAMILY MEDICINE

## 2018-09-06 PROCEDURE — 99214 OFFICE O/P EST MOD 30 MIN: CPT | Performed by: FAMILY MEDICINE

## 2018-09-06 PROCEDURE — 1036F TOBACCO NON-USER: CPT | Performed by: FAMILY MEDICINE

## 2018-09-06 PROCEDURE — 71046 X-RAY EXAM CHEST 2 VIEWS: CPT

## 2018-09-06 PROCEDURE — G8417 CALC BMI ABV UP PARAM F/U: HCPCS | Performed by: FAMILY MEDICINE

## 2018-09-06 PROCEDURE — G8598 ASA/ANTIPLAT THER USED: HCPCS | Performed by: FAMILY MEDICINE

## 2018-09-06 PROCEDURE — G8427 DOCREV CUR MEDS BY ELIG CLIN: HCPCS | Performed by: FAMILY MEDICINE

## 2018-09-06 RX ORDER — AZITHROMYCIN 250 MG/1
TABLET, FILM COATED ORAL
Qty: 1 PACKET | Refills: 0 | Status: SHIPPED | OUTPATIENT
Start: 2018-09-06 | End: 2020-09-17 | Stop reason: ALTCHOICE

## 2018-09-06 NOTE — PROGRESS NOTES
Patient is here for follow up of hypertension . He was taking Losartan/ HCTZ and Amlodipine , but stopped both 10 days ago or so. His blood pressure had been 120-140s/ ??. He was started on Losartan at end of 2/18 and then changed to Losartan / HCTZ  100/ 25 qd in 3/18. He started with a cough at end of  7/18 . He was going to boot camp indoors and noticed the cough . Cough is clear . He is retired as of 8/18 . He is noticing the cough when exercising. Rarely did the cough awaken him. He stopped the Losartan /HCTZ 9-10 days ago and coughing a bit less. Less tightness. Last exercised yesterday with biking. Last book camp was 2 weeks ago or so. No nasal congestion , post nasal drip , watery, scratchy eyes, or sneezing. No fever. Review of Systems    ROS: All other systems were reviewed and are negative . Patient's allergies and medications were reviewed. Patient's past medical, surgical, social , and family history were reviewed. OBJECTIVE:  BP (!) 158/80   Pulse 72   Temp 97.3 °F (36.3 °C) (Oral)   Resp 12   Ht 5' 4\" (1.626 m)   Wt 161 lb 6.4 oz (73.2 kg)   SpO2 98%   BMI 27.70 kg/m²     Physical Exam    General: NAD, cooperative, alert and oriented X 3. Mood / affect is good. good insight. well hydrated. HEENT: PERRLA, EOMI, TMs - clear. Nasopharynx clear. Neck : no lymphadenopathy, supple, FROM  CV: Regular rate and rhythm , no murmurs/ rub/ gallop. No edema. Lungs : CTA bilaterally, breathing comfortably  Abdomen: positive bowel sounds, soft , non tender, non distended. No hepatosplenomegaly. No CVA tenderness. Skin: no rashes. Non tender. ASSESSMENT/  PLAN:  1. Acute bronchitis, unspecified organism  - push fluids - water. - XR CHEST STANDARD (2 VW); Future and follow up after completed/ prn.   - azithromycin (ZITHROMAX) 250 MG tablet; Take 2 tabs (500 mg) on Day 1, and take 1 tab (250 mg) on days 2 through 5. Dispense: 1 packet; Refill: 0    2.  Wheezing  - XR CHEST

## 2018-10-01 RX ORDER — PRAVASTATIN SODIUM 40 MG
40 TABLET ORAL DAILY
Qty: 30 TABLET | Refills: 1 | Status: SHIPPED | OUTPATIENT
Start: 2018-10-01 | End: 2020-09-17 | Stop reason: SDUPTHER

## 2018-10-17 ENCOUNTER — OFFICE VISIT (OUTPATIENT)
Dept: FAMILY MEDICINE CLINIC | Age: 63
End: 2018-10-17
Payer: COMMERCIAL

## 2018-10-17 VITALS
TEMPERATURE: 97.8 F | WEIGHT: 159 LBS | HEART RATE: 65 BPM | OXYGEN SATURATION: 97 % | SYSTOLIC BLOOD PRESSURE: 138 MMHG | BODY MASS INDEX: 27.29 KG/M2 | RESPIRATION RATE: 12 BRPM | DIASTOLIC BLOOD PRESSURE: 71 MMHG

## 2018-10-17 DIAGNOSIS — R73.01 ELEVATED FASTING GLUCOSE: ICD-10-CM

## 2018-10-17 DIAGNOSIS — I25.10 CORONARY ARTERY DISEASE INVOLVING NATIVE CORONARY ARTERY OF NATIVE HEART, ANGINA PRESENCE UNSPECIFIED: ICD-10-CM

## 2018-10-17 DIAGNOSIS — Z23 NEED FOR SHINGLES VACCINE: ICD-10-CM

## 2018-10-17 DIAGNOSIS — Z11.4 ENCOUNTER FOR SCREENING FOR HIV: ICD-10-CM

## 2018-10-17 DIAGNOSIS — I10 ESSENTIAL HYPERTENSION: Primary | ICD-10-CM

## 2018-10-17 DIAGNOSIS — E83.110 HEREDITARY HEMOCHROMATOSIS (HCC): ICD-10-CM

## 2018-10-17 DIAGNOSIS — D72.819 LEUKOPENIA, UNSPECIFIED TYPE: ICD-10-CM

## 2018-10-17 DIAGNOSIS — Z11.59 NEED FOR HEPATITIS C SCREENING TEST: ICD-10-CM

## 2018-10-17 PROCEDURE — G8417 CALC BMI ABV UP PARAM F/U: HCPCS | Performed by: FAMILY MEDICINE

## 2018-10-17 PROCEDURE — 3017F COLORECTAL CA SCREEN DOC REV: CPT | Performed by: FAMILY MEDICINE

## 2018-10-17 PROCEDURE — 1036F TOBACCO NON-USER: CPT | Performed by: FAMILY MEDICINE

## 2018-10-17 PROCEDURE — 99214 OFFICE O/P EST MOD 30 MIN: CPT | Performed by: FAMILY MEDICINE

## 2018-10-17 PROCEDURE — G8598 ASA/ANTIPLAT THER USED: HCPCS | Performed by: FAMILY MEDICINE

## 2018-10-17 PROCEDURE — G8484 FLU IMMUNIZE NO ADMIN: HCPCS | Performed by: FAMILY MEDICINE

## 2018-10-17 PROCEDURE — G8427 DOCREV CUR MEDS BY ELIG CLIN: HCPCS | Performed by: FAMILY MEDICINE

## 2018-10-17 RX ORDER — AMLODIPINE BESYLATE 10 MG/1
10 TABLET ORAL DAILY
Qty: 90 TABLET | Refills: 0 | Status: SHIPPED | OUTPATIENT
Start: 2018-10-17 | End: 2019-01-18 | Stop reason: SDUPTHER

## 2018-10-17 NOTE — PROGRESS NOTES
Patient is here for follow up of hypertension, CAD, and elevated glucose. No longer coughing. Off the Losartan at end if 8.18 , restarted the Losartan in mid 9/18. He has been taking the Amlodipine regularly. He is back to exercising - run and walking. He is having left 5th metatarsal pain and is concerned with stress fracture. He is biking now , but not in bad weather. No chest pain , shortness of breath or palpitations or dizziness . Light headedness. No lapses in consciousness. He had PSA in 6/18 with Dr. Tommie Lorenzana. He sees Dr. Lizz Frost q 4 months. He gets phlebotomy q 4 months. Review of Systems    ROS: All other systems were reviewed and are negative . Patient's allergies and medications were reviewed. Patient's past medical, surgical, social , and family history were reviewed. Wt Readings from Last 3 Encounters:   10/17/18 159 lb (72.1 kg)   09/06/18 161 lb 6.4 oz (73.2 kg)   05/16/18 157 lb 6.4 oz (71.4 kg)     OBJECTIVE:  /71   Pulse 65   Temp 97.8 °F (36.6 °C)   Resp 12   Wt 159 lb (72.1 kg)   SpO2 97%   BMI 27.29 kg/m²     Physical Exam    General: NAD, cooperative, alert and oriented X 3. Mood / affect is good. good insight. well hydrated. Neck : no lymphadenopathy, supple, FROM  CV: Regular rate and rhythm , no murmurs/ rub/ gallop. No edema. Lungs : CTA bilaterally, breathing comfortably  Abdomen: positive bowel sounds, soft , non tender, non distended. No hepatosplenomegaly. No CVA tenderness. Skin: no rashes. Non tender. ASSESSMENT/  PLAN:  1. Essential hypertension  - elevated today. Increase Norvasc 10 mg qd. - continue Losartan/ HCTZ 100/25 mg qd and low sodium diet. - amLODIPine (NORVASC) 10 MG tablet; Take 1 tablet by mouth daily  Dispense: 90 tablet; Refill: 0  - Comprehensive Metabolic Panel; Future    2. Elevated fasting glucose  - Continue dietary/ lifestyle modifications, including decreased concentrated sweets and carbohydrates.    - Comprehensive Metabolic Panel; Future,  Hemoglobin A1C; Future    3. Leukopenia, unspecified type  - followed by Hematologist.     4. Coronary artery disease involving native coronary artery of native heart, angina presence unspecified  - stable. Continue Pravastatin 40 mg qd and low cholesterol diet. - Comprehensive Metabolic Panel; Future,  Lipid Panel; Future    5. Hereditary hemochromatosis ([facility] [address]  - followed by Hematologist with phlebotomy q 4 months. 6. Encounter for screening for HIV  - HIV Screen; Future    7. Need for hepatitis C screening test  - Hepatitis C Antibody; Future    8. Need for shingles vaccine  - zoster recombinant adjuvanted vaccine (SHINGRIX) 50 MCG SUSR injection; Inject 0.5 mLs into the muscle once for 1 dose  Dispense: 0.5 mL; Refill: 0. Advised to check on insurance coverage. Follow up 6 months/ prn.

## 2018-10-18 DIAGNOSIS — I25.10 CORONARY ARTERY DISEASE INVOLVING NATIVE CORONARY ARTERY OF NATIVE HEART, ANGINA PRESENCE UNSPECIFIED: ICD-10-CM

## 2018-10-18 DIAGNOSIS — Z11.59 NEED FOR HEPATITIS C SCREENING TEST: ICD-10-CM

## 2018-10-18 DIAGNOSIS — I10 ESSENTIAL HYPERTENSION: ICD-10-CM

## 2018-10-18 DIAGNOSIS — R73.01 ELEVATED FASTING GLUCOSE: ICD-10-CM

## 2018-10-18 DIAGNOSIS — Z11.4 ENCOUNTER FOR SCREENING FOR HIV: ICD-10-CM

## 2018-10-18 LAB
A/G RATIO: 2.9 (ref 1.1–2.2)
ALBUMIN SERPL-MCNC: 4.9 G/DL (ref 3.4–5)
ALP BLD-CCNC: 37 U/L (ref 40–129)
ALT SERPL-CCNC: 20 U/L (ref 10–40)
ANION GAP SERPL CALCULATED.3IONS-SCNC: 15 MMOL/L (ref 3–16)
AST SERPL-CCNC: 26 U/L (ref 15–37)
BILIRUB SERPL-MCNC: 0.6 MG/DL (ref 0–1)
BUN BLDV-MCNC: 6 MG/DL (ref 7–20)
CALCIUM SERPL-MCNC: 9.4 MG/DL (ref 8.3–10.6)
CHLORIDE BLD-SCNC: 90 MMOL/L (ref 99–110)
CHOLESTEROL, TOTAL: 146 MG/DL (ref 0–199)
CO2: 23 MMOL/L (ref 21–32)
CREAT SERPL-MCNC: 0.6 MG/DL (ref 0.8–1.3)
ESTIMATED AVERAGE GLUCOSE: 76.7 MG/DL
GFR AFRICAN AMERICAN: >60
GFR NON-AFRICAN AMERICAN: >60
GLOBULIN: 1.7 G/DL
GLUCOSE BLD-MCNC: 81 MG/DL (ref 70–99)
HBA1C MFR BLD: 4.3 %
HDLC SERPL-MCNC: 66 MG/DL (ref 40–60)
HEPATITIS C ANTIBODY INTERPRETATION: NORMAL
LDL CHOLESTEROL CALCULATED: 60 MG/DL
POTASSIUM SERPL-SCNC: 4 MMOL/L (ref 3.5–5.1)
SODIUM BLD-SCNC: 128 MMOL/L (ref 136–145)
TOTAL PROTEIN: 6.6 G/DL (ref 6.4–8.2)
TRIGL SERPL-MCNC: 99 MG/DL (ref 0–150)
VLDLC SERPL CALC-MCNC: 20 MG/DL

## 2018-10-19 LAB
HIV AG/AB: NORMAL
HIV ANTIGEN: NORMAL
HIV-1 ANTIBODY: NORMAL
HIV-2 AB: NORMAL

## 2018-12-03 ENCOUNTER — OFFICE VISIT (OUTPATIENT)
Dept: FAMILY MEDICINE CLINIC | Age: 63
End: 2018-12-03
Payer: COMMERCIAL

## 2018-12-03 VITALS
BODY MASS INDEX: 28.18 KG/M2 | WEIGHT: 164.2 LBS | OXYGEN SATURATION: 97 % | TEMPERATURE: 97.5 F | SYSTOLIC BLOOD PRESSURE: 141 MMHG | DIASTOLIC BLOOD PRESSURE: 78 MMHG | HEART RATE: 72 BPM

## 2018-12-03 DIAGNOSIS — M54.42 ACUTE RIGHT-SIDED LOW BACK PAIN WITH BILATERAL SCIATICA: Primary | ICD-10-CM

## 2018-12-03 DIAGNOSIS — M54.41 ACUTE RIGHT-SIDED LOW BACK PAIN WITH BILATERAL SCIATICA: Primary | ICD-10-CM

## 2018-12-03 PROCEDURE — G8417 CALC BMI ABV UP PARAM F/U: HCPCS | Performed by: FAMILY MEDICINE

## 2018-12-03 PROCEDURE — G8598 ASA/ANTIPLAT THER USED: HCPCS | Performed by: FAMILY MEDICINE

## 2018-12-03 PROCEDURE — 3017F COLORECTAL CA SCREEN DOC REV: CPT | Performed by: FAMILY MEDICINE

## 2018-12-03 PROCEDURE — 1036F TOBACCO NON-USER: CPT | Performed by: FAMILY MEDICINE

## 2018-12-03 PROCEDURE — 99214 OFFICE O/P EST MOD 30 MIN: CPT | Performed by: FAMILY MEDICINE

## 2018-12-03 PROCEDURE — G8427 DOCREV CUR MEDS BY ELIG CLIN: HCPCS | Performed by: FAMILY MEDICINE

## 2018-12-03 PROCEDURE — G8484 FLU IMMUNIZE NO ADMIN: HCPCS | Performed by: FAMILY MEDICINE

## 2018-12-03 RX ORDER — CYCLOBENZAPRINE HCL 10 MG
10 TABLET ORAL 3 TIMES DAILY PRN
Qty: 30 TABLET | Refills: 0 | Status: SHIPPED | OUTPATIENT
Start: 2018-12-03 | End: 2018-12-13

## 2018-12-03 RX ORDER — METHYLPREDNISOLONE 4 MG/1
TABLET ORAL
Qty: 1 KIT | Refills: 0 | Status: SHIPPED | OUTPATIENT
Start: 2018-12-03 | End: 2018-12-09

## 2018-12-03 NOTE — PATIENT INSTRUCTIONS
Patient Education        Low Back Pain: Exercises  Your Care Instructions  Here are some examples of typical rehabilitation exercises for your condition. Start each exercise slowly. Ease off the exercise if you start to have pain. Your doctor or physical therapist will tell you when you can start these exercises and which ones will work best for you. How to do the exercises  Press-up    1. Lie on your stomach, supporting your body with your forearms. 2. Press your elbows down into the floor to raise your upper back. As you do this, relax your stomach muscles and allow your back to arch without using your back muscles. As your press up, do not let your hips or pelvis come off the floor. 3. Hold for 15 to 30 seconds, then relax. 4. Repeat 2 to 4 times. Alternate arm and leg (bird dog) exercise    1. Start on the floor, on your hands and knees. 2. Tighten your belly muscles. 3. Raise one leg off the floor, and hold it straight out behind you. Be careful not to let your hip drop down, because that will twist your trunk. 4. Hold for about 6 seconds, then lower your leg and switch to the other leg. 5. Repeat 8 to 12 times on each leg. 6. Over time, work up to holding for 10 to 30 seconds each time. 7. If you feel stable and secure with your leg raised, try raising the opposite arm straight out in front of you at the same time. Knee-to-chest exercise    1. Lie on your back with your knees bent and your feet flat on the floor. 2. Bring one knee to your chest, keeping the other foot flat on the floor (or keeping the other leg straight, whichever feels better on your lower back). 3. Keep your lower back pressed to the floor. Hold for at least 15 to 30 seconds. 4. Relax, and lower the knee to the starting position. 5. Repeat with the other leg. Repeat 2 to 4 times with each leg. 6. To get more stretch, put your other leg flat on the floor while pulling your knee to your chest.    Curl-ups    1.  Lie on

## 2018-12-13 DIAGNOSIS — I10 ESSENTIAL HYPERTENSION: ICD-10-CM

## 2018-12-13 RX ORDER — LOSARTAN POTASSIUM AND HYDROCHLOROTHIAZIDE 25; 100 MG/1; MG/1
TABLET ORAL
Qty: 90 TABLET | Refills: 1 | Status: SHIPPED | OUTPATIENT
Start: 2018-12-13 | End: 2019-06-07 | Stop reason: SDUPTHER

## 2018-12-18 LAB
HCT VFR BLD CALC: NORMAL % (ref 41–53)
HEMOGLOBIN: NORMAL G/DL (ref 13.5–17.5)
NEUTROPHILS ABSOLUTE: NORMAL /ΜL
PLATELET # BLD: NORMAL K/ΜL
WBC # BLD: NORMAL 10^3/ML

## 2018-12-24 RX ORDER — PRAVASTATIN SODIUM 40 MG
TABLET ORAL
Qty: 30 TABLET | Refills: 5 | Status: SHIPPED | OUTPATIENT
Start: 2018-12-24 | End: 2019-05-08 | Stop reason: SDUPTHER

## 2019-01-02 ENCOUNTER — OFFICE VISIT (OUTPATIENT)
Dept: FAMILY MEDICINE CLINIC | Age: 64
End: 2019-01-02
Payer: COMMERCIAL

## 2019-01-02 ENCOUNTER — HOSPITAL ENCOUNTER (OUTPATIENT)
Age: 64
Discharge: HOME | End: 2019-01-02
Payer: COMMERCIAL

## 2019-01-02 ENCOUNTER — HOSPITAL ENCOUNTER (OUTPATIENT)
Dept: GENERAL RADIOLOGY | Age: 64
Discharge: HOME | End: 2019-01-02
Payer: COMMERCIAL

## 2019-01-02 VITALS
BODY MASS INDEX: 28.01 KG/M2 | TEMPERATURE: 97.3 F | SYSTOLIC BLOOD PRESSURE: 127 MMHG | RESPIRATION RATE: 12 BRPM | DIASTOLIC BLOOD PRESSURE: 74 MMHG | OXYGEN SATURATION: 97 % | WEIGHT: 163.2 LBS | HEART RATE: 85 BPM

## 2019-01-02 DIAGNOSIS — M54.50 ACUTE RIGHT-SIDED LOW BACK PAIN WITHOUT SCIATICA: ICD-10-CM

## 2019-01-02 DIAGNOSIS — N45.1 RIGHT EPIDIDYMITIS: Primary | ICD-10-CM

## 2019-01-02 DIAGNOSIS — R10.31 RIGHT GROIN PAIN: ICD-10-CM

## 2019-01-02 PROCEDURE — 3017F COLORECTAL CA SCREEN DOC REV: CPT | Performed by: FAMILY MEDICINE

## 2019-01-02 PROCEDURE — 72110 X-RAY EXAM L-2 SPINE 4/>VWS: CPT

## 2019-01-02 PROCEDURE — G8484 FLU IMMUNIZE NO ADMIN: HCPCS | Performed by: FAMILY MEDICINE

## 2019-01-02 PROCEDURE — G8598 ASA/ANTIPLAT THER USED: HCPCS | Performed by: FAMILY MEDICINE

## 2019-01-02 PROCEDURE — 99214 OFFICE O/P EST MOD 30 MIN: CPT | Performed by: FAMILY MEDICINE

## 2019-01-02 PROCEDURE — G8417 CALC BMI ABV UP PARAM F/U: HCPCS | Performed by: FAMILY MEDICINE

## 2019-01-02 PROCEDURE — 1036F TOBACCO NON-USER: CPT | Performed by: FAMILY MEDICINE

## 2019-01-02 PROCEDURE — G8427 DOCREV CUR MEDS BY ELIG CLIN: HCPCS | Performed by: FAMILY MEDICINE

## 2019-01-02 RX ORDER — LEVOFLOXACIN 500 MG/1
500 TABLET, FILM COATED ORAL DAILY
Qty: 14 TABLET | Refills: 0 | Status: SHIPPED | OUTPATIENT
Start: 2019-01-02 | End: 2019-01-07 | Stop reason: SDUPTHER

## 2019-01-02 RX ORDER — PREDNISONE 20 MG/1
TABLET ORAL
Qty: 12 TABLET | Refills: 0 | Status: SHIPPED | OUTPATIENT
Start: 2019-01-02 | End: 2019-01-12

## 2019-01-07 DIAGNOSIS — N45.1 RIGHT EPIDIDYMITIS: ICD-10-CM

## 2019-01-07 RX ORDER — LEVOFLOXACIN 500 MG/1
500 TABLET, FILM COATED ORAL DAILY
Qty: 14 TABLET | Refills: 0 | Status: SHIPPED | OUTPATIENT
Start: 2019-01-07 | End: 2019-01-21

## 2019-01-09 DIAGNOSIS — N45.1 RIGHT EPIDIDYMITIS: ICD-10-CM

## 2019-01-09 RX ORDER — LEVOFLOXACIN 500 MG/1
500 TABLET, FILM COATED ORAL DAILY
Qty: 14 TABLET | Refills: 0 | OUTPATIENT
Start: 2019-01-09 | End: 2019-01-23

## 2019-01-18 DIAGNOSIS — I10 ESSENTIAL HYPERTENSION: ICD-10-CM

## 2019-01-18 RX ORDER — AMLODIPINE BESYLATE 10 MG/1
TABLET ORAL
Qty: 90 TABLET | Refills: 1 | Status: SHIPPED | OUTPATIENT
Start: 2019-01-18 | End: 2019-08-01 | Stop reason: SDUPTHER

## 2019-01-22 ENCOUNTER — OFFICE VISIT (OUTPATIENT)
Dept: FAMILY MEDICINE CLINIC | Age: 64
End: 2019-01-22
Payer: COMMERCIAL

## 2019-01-22 VITALS
OXYGEN SATURATION: 98 % | WEIGHT: 165.2 LBS | TEMPERATURE: 97.9 F | SYSTOLIC BLOOD PRESSURE: 130 MMHG | DIASTOLIC BLOOD PRESSURE: 75 MMHG | HEART RATE: 85 BPM | BODY MASS INDEX: 28.36 KG/M2 | RESPIRATION RATE: 10 BRPM

## 2019-01-22 DIAGNOSIS — R20.2 NUMBNESS AND TINGLING OF RIGHT LEG: ICD-10-CM

## 2019-01-22 DIAGNOSIS — I10 ESSENTIAL HYPERTENSION: Primary | ICD-10-CM

## 2019-01-22 DIAGNOSIS — R73.01 ELEVATED FASTING GLUCOSE: ICD-10-CM

## 2019-01-22 DIAGNOSIS — M51.36 DDD (DEGENERATIVE DISC DISEASE), LUMBAR: ICD-10-CM

## 2019-01-22 DIAGNOSIS — I25.10 CORONARY ARTERY DISEASE INVOLVING NATIVE CORONARY ARTERY OF NATIVE HEART, ANGINA PRESENCE UNSPECIFIED: ICD-10-CM

## 2019-01-22 DIAGNOSIS — R20.0 NUMBNESS AND TINGLING OF RIGHT LEG: ICD-10-CM

## 2019-01-22 DIAGNOSIS — N45.1 RIGHT EPIDIDYMITIS: ICD-10-CM

## 2019-01-22 DIAGNOSIS — R29.898 WEAKNESS OF RIGHT LEG: ICD-10-CM

## 2019-01-22 PROCEDURE — 1036F TOBACCO NON-USER: CPT | Performed by: FAMILY MEDICINE

## 2019-01-22 PROCEDURE — G8598 ASA/ANTIPLAT THER USED: HCPCS | Performed by: FAMILY MEDICINE

## 2019-01-22 PROCEDURE — G8484 FLU IMMUNIZE NO ADMIN: HCPCS | Performed by: FAMILY MEDICINE

## 2019-01-22 PROCEDURE — 3017F COLORECTAL CA SCREEN DOC REV: CPT | Performed by: FAMILY MEDICINE

## 2019-01-22 PROCEDURE — G8417 CALC BMI ABV UP PARAM F/U: HCPCS | Performed by: FAMILY MEDICINE

## 2019-01-22 PROCEDURE — G8427 DOCREV CUR MEDS BY ELIG CLIN: HCPCS | Performed by: FAMILY MEDICINE

## 2019-01-22 PROCEDURE — 99214 OFFICE O/P EST MOD 30 MIN: CPT | Performed by: FAMILY MEDICINE

## 2019-01-22 RX ORDER — PREDNISONE 20 MG/1
TABLET ORAL
Qty: 18 TABLET | Refills: 0 | Status: SHIPPED | OUTPATIENT
Start: 2019-01-22 | End: 2019-01-31

## 2019-01-22 RX ORDER — LEVOFLOXACIN 500 MG/1
500 TABLET, FILM COATED ORAL DAILY
COMMUNITY
End: 2019-01-22 | Stop reason: SDUPTHER

## 2019-01-22 RX ORDER — LEVOFLOXACIN 500 MG/1
500 TABLET, FILM COATED ORAL DAILY
Qty: 14 TABLET | Refills: 0 | Status: SHIPPED | OUTPATIENT
Start: 2019-01-22 | End: 2019-02-01 | Stop reason: SDUPTHER

## 2019-01-30 ENCOUNTER — HOSPITAL ENCOUNTER (OUTPATIENT)
Dept: MRI IMAGING | Age: 64
Discharge: HOME | End: 2019-01-30
Payer: COMMERCIAL

## 2019-01-30 DIAGNOSIS — R20.2 NUMBNESS AND TINGLING OF RIGHT LEG: ICD-10-CM

## 2019-01-30 DIAGNOSIS — R29.898 WEAKNESS OF RIGHT LEG: ICD-10-CM

## 2019-01-30 DIAGNOSIS — R20.0 NUMBNESS AND TINGLING OF RIGHT LEG: ICD-10-CM

## 2019-01-30 PROCEDURE — 72158 MRI LUMBAR SPINE W/O & W/DYE: CPT

## 2019-01-30 PROCEDURE — A9579 GAD-BASE MR CONTRAST NOS,1ML: HCPCS | Performed by: FAMILY MEDICINE

## 2019-01-30 PROCEDURE — 6360000004 HC RX CONTRAST MEDICATION: Performed by: FAMILY MEDICINE

## 2019-01-30 RX ADMIN — GADOTERIDOL 14 ML: 279.3 INJECTION, SOLUTION INTRAVENOUS at 14:14

## 2019-02-01 DIAGNOSIS — M51.36 DDD (DEGENERATIVE DISC DISEASE), LUMBAR: Primary | ICD-10-CM

## 2019-02-01 DIAGNOSIS — M48.061 SPINAL STENOSIS OF LUMBAR REGION, UNSPECIFIED WHETHER NEUROGENIC CLAUDICATION PRESENT: ICD-10-CM

## 2019-02-01 DIAGNOSIS — N45.1 RIGHT EPIDIDYMITIS: ICD-10-CM

## 2019-02-01 DIAGNOSIS — M48.061 FORAMINAL STENOSIS OF LUMBAR REGION: ICD-10-CM

## 2019-02-01 RX ORDER — LEVOFLOXACIN 500 MG/1
500 TABLET, FILM COATED ORAL DAILY
Qty: 10 TABLET | Refills: 0 | Status: SHIPPED | OUTPATIENT
Start: 2019-02-01 | End: 2020-09-17 | Stop reason: ALTCHOICE

## 2019-03-25 ENCOUNTER — OFFICE VISIT (OUTPATIENT)
Dept: FAMILY MEDICINE CLINIC | Age: 64
End: 2019-03-25
Payer: COMMERCIAL

## 2019-03-25 VITALS
WEIGHT: 161 LBS | OXYGEN SATURATION: 99 % | BODY MASS INDEX: 27.49 KG/M2 | HEART RATE: 80 BPM | HEIGHT: 64 IN | DIASTOLIC BLOOD PRESSURE: 81 MMHG | SYSTOLIC BLOOD PRESSURE: 133 MMHG

## 2019-03-25 DIAGNOSIS — I10 ESSENTIAL HYPERTENSION: ICD-10-CM

## 2019-03-25 DIAGNOSIS — M51.36 DDD (DEGENERATIVE DISC DISEASE), LUMBAR: ICD-10-CM

## 2019-03-25 DIAGNOSIS — R42 VERTIGO: Primary | ICD-10-CM

## 2019-03-25 DIAGNOSIS — I25.10 CORONARY ARTERY DISEASE INVOLVING NATIVE CORONARY ARTERY OF NATIVE HEART, ANGINA PRESENCE UNSPECIFIED: ICD-10-CM

## 2019-03-25 PROCEDURE — G8484 FLU IMMUNIZE NO ADMIN: HCPCS | Performed by: FAMILY MEDICINE

## 2019-03-25 PROCEDURE — 1036F TOBACCO NON-USER: CPT | Performed by: FAMILY MEDICINE

## 2019-03-25 PROCEDURE — G8417 CALC BMI ABV UP PARAM F/U: HCPCS | Performed by: FAMILY MEDICINE

## 2019-03-25 PROCEDURE — G8427 DOCREV CUR MEDS BY ELIG CLIN: HCPCS | Performed by: FAMILY MEDICINE

## 2019-03-25 PROCEDURE — G8598 ASA/ANTIPLAT THER USED: HCPCS | Performed by: FAMILY MEDICINE

## 2019-03-25 PROCEDURE — 3017F COLORECTAL CA SCREEN DOC REV: CPT | Performed by: FAMILY MEDICINE

## 2019-03-25 PROCEDURE — 99214 OFFICE O/P EST MOD 30 MIN: CPT | Performed by: FAMILY MEDICINE

## 2019-03-25 ASSESSMENT — PATIENT HEALTH QUESTIONNAIRE - PHQ9
SUM OF ALL RESPONSES TO PHQ QUESTIONS 1-9: 0
SUM OF ALL RESPONSES TO PHQ QUESTIONS 1-9: 0
1. LITTLE INTEREST OR PLEASURE IN DOING THINGS: 0
SUM OF ALL RESPONSES TO PHQ9 QUESTIONS 1 & 2: 0
2. FEELING DOWN, DEPRESSED OR HOPELESS: 0

## 2019-05-08 RX ORDER — PRAVASTATIN SODIUM 40 MG
40 TABLET ORAL DAILY
Qty: 30 TABLET | Refills: 1 | Status: SHIPPED | OUTPATIENT
Start: 2019-05-08 | End: 2020-09-17 | Stop reason: SDUPTHER

## 2019-05-09 ENCOUNTER — TELEPHONE (OUTPATIENT)
Dept: PAIN MANAGEMENT | Age: 64
End: 2019-05-09

## 2019-05-13 NOTE — TELEPHONE ENCOUNTER
Eucrisa ointment denial letter was received. Please see if it is affordable for the patient using copay cards if he has commercial insurance . copay cards should be available on line. Please inform the patient . If his insurance has been covering , an appeal may be able to be done. If problems, please have him schedule an appointment to discuss, including options.

## 2019-05-14 NOTE — TELEPHONE ENCOUNTER
Spoke with patient and he is going to check into coupons and will call back if needs to change medication and schedule appointment

## 2019-05-29 DIAGNOSIS — L30.9 ECZEMA, UNSPECIFIED TYPE: ICD-10-CM

## 2019-06-03 RX ORDER — PRAVASTATIN SODIUM 40 MG
40 TABLET ORAL DAILY
Qty: 30 TABLET | Refills: 1 | Status: SHIPPED | OUTPATIENT
Start: 2019-06-03 | End: 2019-07-04 | Stop reason: SDUPTHER

## 2019-06-06 NOTE — TELEPHONE ENCOUNTER
Received APPROVAL for Eucrisa 2% ointment until 06/05/2020. Approval letter attached. Please advise patient. Thank you.

## 2019-06-06 NOTE — TELEPHONE ENCOUNTER
Received APPROVAL for Eucrisa 2% ointment until 06/05/2020. Approval letter scanned in 05/09/2019 Telephone Encounter.

## 2019-06-07 DIAGNOSIS — I10 ESSENTIAL HYPERTENSION: ICD-10-CM

## 2019-06-07 RX ORDER — LOSARTAN POTASSIUM AND HYDROCHLOROTHIAZIDE 25; 100 MG/1; MG/1
1 TABLET ORAL DAILY
Qty: 90 TABLET | Refills: 0 | Status: SHIPPED | OUTPATIENT
Start: 2019-06-07 | End: 2019-09-06 | Stop reason: SDUPTHER

## 2019-07-08 RX ORDER — PRAVASTATIN SODIUM 40 MG
40 TABLET ORAL DAILY
Qty: 30 TABLET | Refills: 1 | Status: SHIPPED | OUTPATIENT
Start: 2019-07-08 | End: 2019-08-03 | Stop reason: SDUPTHER

## 2019-08-01 DIAGNOSIS — I10 ESSENTIAL HYPERTENSION: ICD-10-CM

## 2019-08-01 RX ORDER — AMLODIPINE BESYLATE 10 MG/1
TABLET ORAL
Qty: 90 TABLET | Refills: 0 | Status: SHIPPED | OUTPATIENT
Start: 2019-08-01 | End: 2019-10-27 | Stop reason: SDUPTHER

## 2019-08-05 RX ORDER — PRAVASTATIN SODIUM 40 MG
40 TABLET ORAL DAILY
Qty: 30 TABLET | Refills: 1 | Status: SHIPPED | OUTPATIENT
Start: 2019-08-05 | End: 2019-08-29 | Stop reason: SDUPTHER

## 2019-08-29 RX ORDER — PRAVASTATIN SODIUM 40 MG
40 TABLET ORAL DAILY
Qty: 30 TABLET | Refills: 0 | Status: SHIPPED | OUTPATIENT
Start: 2019-08-29 | End: 2019-09-26 | Stop reason: SDUPTHER

## 2019-09-06 DIAGNOSIS — I10 ESSENTIAL HYPERTENSION: ICD-10-CM

## 2019-09-06 RX ORDER — LOSARTAN POTASSIUM AND HYDROCHLOROTHIAZIDE 25; 100 MG/1; MG/1
1 TABLET ORAL DAILY
Qty: 90 TABLET | Refills: 0 | Status: SHIPPED | OUTPATIENT
Start: 2019-09-06 | End: 2020-09-17 | Stop reason: ALTCHOICE

## 2019-09-10 ENCOUNTER — TELEPHONE (OUTPATIENT)
Dept: FAMILY MEDICINE CLINIC | Age: 64
End: 2019-09-10

## 2019-09-10 DIAGNOSIS — I10 ESSENTIAL HYPERTENSION: Primary | ICD-10-CM

## 2019-09-10 RX ORDER — LOSARTAN POTASSIUM 100 MG/1
100 TABLET ORAL DAILY
Qty: 90 TABLET | Refills: 0 | Status: SHIPPED | OUTPATIENT
Start: 2019-09-10 | End: 2019-12-03 | Stop reason: SDUPTHER

## 2019-09-10 RX ORDER — HYDROCHLOROTHIAZIDE 25 MG/1
25 TABLET ORAL EVERY MORNING
Qty: 90 TABLET | Refills: 0 | Status: SHIPPED | OUTPATIENT
Start: 2019-09-10 | End: 2019-12-03 | Stop reason: SDUPTHER

## 2019-09-27 RX ORDER — PRAVASTATIN SODIUM 40 MG
40 TABLET ORAL DAILY
Qty: 30 TABLET | Refills: 0 | Status: SHIPPED | OUTPATIENT
Start: 2019-09-27 | End: 2019-10-22 | Stop reason: SDUPTHER

## 2019-10-10 ENCOUNTER — OFFICE VISIT (OUTPATIENT)
Dept: FAMILY MEDICINE CLINIC | Age: 64
End: 2019-10-10
Payer: MEDICARE

## 2019-10-10 VITALS
BODY MASS INDEX: 27.02 KG/M2 | RESPIRATION RATE: 16 BRPM | TEMPERATURE: 97.5 F | HEART RATE: 69 BPM | OXYGEN SATURATION: 98 % | DIASTOLIC BLOOD PRESSURE: 74 MMHG | SYSTOLIC BLOOD PRESSURE: 109 MMHG | WEIGHT: 157.4 LBS

## 2019-10-10 DIAGNOSIS — I10 ESSENTIAL HYPERTENSION: ICD-10-CM

## 2019-10-10 DIAGNOSIS — Z23 NEEDS FLU SHOT: ICD-10-CM

## 2019-10-10 DIAGNOSIS — R10.11 RUQ ABDOMINAL PAIN: Primary | ICD-10-CM

## 2019-10-10 DIAGNOSIS — R73.01 ELEVATED FASTING GLUCOSE: ICD-10-CM

## 2019-10-10 DIAGNOSIS — Z23 NEED FOR VACCINATION FOR STREP PNEUMONIAE: ICD-10-CM

## 2019-10-10 DIAGNOSIS — R10.11 RUQ ABDOMINAL PAIN: ICD-10-CM

## 2019-10-10 DIAGNOSIS — I25.10 CORONARY ARTERY DISEASE INVOLVING NATIVE CORONARY ARTERY OF NATIVE HEART, ANGINA PRESENCE UNSPECIFIED: ICD-10-CM

## 2019-10-10 DIAGNOSIS — E83.110 HEREDITARY HEMOCHROMATOSIS (HCC): ICD-10-CM

## 2019-10-10 LAB
A/G RATIO: 3.3 (ref 1.1–2.2)
ALBUMIN SERPL-MCNC: 5.3 G/DL (ref 3.4–5)
ALP BLD-CCNC: 50 U/L (ref 40–129)
ALT SERPL-CCNC: 15 U/L (ref 10–40)
ANION GAP SERPL CALCULATED.3IONS-SCNC: 15 MMOL/L (ref 3–16)
AST SERPL-CCNC: 17 U/L (ref 15–37)
BASOPHILS ABSOLUTE: 0 K/UL (ref 0–0.2)
BASOPHILS RELATIVE PERCENT: 0.6 %
BILIRUB SERPL-MCNC: 1.3 MG/DL (ref 0–1)
BUN BLDV-MCNC: 11 MG/DL (ref 7–20)
CALCIUM SERPL-MCNC: 9.9 MG/DL (ref 8.3–10.6)
CHLORIDE BLD-SCNC: 93 MMOL/L (ref 99–110)
CHOLESTEROL, TOTAL: 147 MG/DL (ref 0–199)
CO2: 24 MMOL/L (ref 21–32)
CREAT SERPL-MCNC: 0.7 MG/DL (ref 0.8–1.3)
EOSINOPHILS ABSOLUTE: 0.1 K/UL (ref 0–0.6)
EOSINOPHILS RELATIVE PERCENT: 2.3 %
GFR AFRICAN AMERICAN: >60
GFR NON-AFRICAN AMERICAN: >60
GLOBULIN: 1.6 G/DL
GLUCOSE BLD-MCNC: 86 MG/DL (ref 70–99)
HCT VFR BLD CALC: 43.1 % (ref 40.5–52.5)
HDLC SERPL-MCNC: 60 MG/DL (ref 40–60)
HEMOGLOBIN: 15.5 G/DL (ref 13.5–17.5)
LDL CHOLESTEROL CALCULATED: 65 MG/DL
LYMPHOCYTES ABSOLUTE: 1.3 K/UL (ref 1–5.1)
LYMPHOCYTES RELATIVE PERCENT: 28.3 %
MCH RBC QN AUTO: 34.2 PG (ref 26–34)
MCHC RBC AUTO-ENTMCNC: 35.9 G/DL (ref 31–36)
MCV RBC AUTO: 95.1 FL (ref 80–100)
MONOCYTES ABSOLUTE: 0.6 K/UL (ref 0–1.3)
MONOCYTES RELATIVE PERCENT: 13.1 %
NEUTROPHILS ABSOLUTE: 2.5 K/UL (ref 1.7–7.7)
NEUTROPHILS RELATIVE PERCENT: 55.7 %
PDW BLD-RTO: 13.1 % (ref 12.4–15.4)
PLATELET # BLD: 190 K/UL (ref 135–450)
PMV BLD AUTO: 8.4 FL (ref 5–10.5)
POTASSIUM SERPL-SCNC: 4 MMOL/L (ref 3.5–5.1)
RBC # BLD: 4.53 M/UL (ref 4.2–5.9)
SODIUM BLD-SCNC: 132 MMOL/L (ref 136–145)
TOTAL PROTEIN: 6.9 G/DL (ref 6.4–8.2)
TRIGL SERPL-MCNC: 108 MG/DL (ref 0–150)
VLDLC SERPL CALC-MCNC: 22 MG/DL
WBC # BLD: 4.6 K/UL (ref 4–11)

## 2019-10-10 PROCEDURE — G8482 FLU IMMUNIZE ORDER/ADMIN: HCPCS | Performed by: FAMILY MEDICINE

## 2019-10-10 PROCEDURE — G8598 ASA/ANTIPLAT THER USED: HCPCS | Performed by: FAMILY MEDICINE

## 2019-10-10 PROCEDURE — G8427 DOCREV CUR MEDS BY ELIG CLIN: HCPCS | Performed by: FAMILY MEDICINE

## 2019-10-10 PROCEDURE — G0008 ADMIN INFLUENZA VIRUS VAC: HCPCS | Performed by: FAMILY MEDICINE

## 2019-10-10 PROCEDURE — 90670 PCV13 VACCINE IM: CPT | Performed by: FAMILY MEDICINE

## 2019-10-10 PROCEDURE — 4040F PNEUMOC VAC/ADMIN/RCVD: CPT | Performed by: FAMILY MEDICINE

## 2019-10-10 PROCEDURE — G0009 ADMIN PNEUMOCOCCAL VACCINE: HCPCS | Performed by: FAMILY MEDICINE

## 2019-10-10 PROCEDURE — 99214 OFFICE O/P EST MOD 30 MIN: CPT | Performed by: FAMILY MEDICINE

## 2019-10-10 PROCEDURE — 1036F TOBACCO NON-USER: CPT | Performed by: FAMILY MEDICINE

## 2019-10-10 PROCEDURE — 90662 IIV NO PRSV INCREASED AG IM: CPT | Performed by: FAMILY MEDICINE

## 2019-10-10 PROCEDURE — 1123F ACP DISCUSS/DSCN MKR DOCD: CPT | Performed by: FAMILY MEDICINE

## 2019-10-10 PROCEDURE — G8417 CALC BMI ABV UP PARAM F/U: HCPCS | Performed by: FAMILY MEDICINE

## 2019-10-10 PROCEDURE — 3017F COLORECTAL CA SCREEN DOC REV: CPT | Performed by: FAMILY MEDICINE

## 2019-10-11 ENCOUNTER — HOSPITAL ENCOUNTER (OUTPATIENT)
Dept: ULTRASOUND IMAGING | Age: 64
Discharge: HOME | End: 2019-10-11
Payer: MEDICARE

## 2019-10-11 DIAGNOSIS — R10.11 RUQ ABDOMINAL PAIN: ICD-10-CM

## 2019-10-11 PROCEDURE — 76705 ECHO EXAM OF ABDOMEN: CPT

## 2019-10-22 RX ORDER — PRAVASTATIN SODIUM 40 MG
40 TABLET ORAL DAILY
Qty: 30 TABLET | Refills: 5 | Status: SHIPPED | OUTPATIENT
Start: 2019-10-22 | End: 2020-08-04

## 2019-10-27 DIAGNOSIS — I10 ESSENTIAL HYPERTENSION: ICD-10-CM

## 2019-10-28 RX ORDER — AMLODIPINE BESYLATE 10 MG/1
TABLET ORAL
Qty: 90 TABLET | Refills: 1 | Status: SHIPPED | OUTPATIENT
Start: 2019-10-28 | End: 2020-04-06

## 2019-12-03 DIAGNOSIS — I10 ESSENTIAL HYPERTENSION: ICD-10-CM

## 2019-12-03 RX ORDER — LOSARTAN POTASSIUM 100 MG/1
100 TABLET ORAL DAILY
Qty: 90 TABLET | Refills: 1 | Status: SHIPPED | OUTPATIENT
Start: 2019-12-03 | End: 2020-10-19

## 2019-12-03 RX ORDER — HYDROCHLOROTHIAZIDE 25 MG/1
25 TABLET ORAL EVERY MORNING
Qty: 90 TABLET | Refills: 1 | Status: SHIPPED | OUTPATIENT
Start: 2019-12-03 | End: 2020-10-19

## 2020-04-06 RX ORDER — AMLODIPINE BESYLATE 10 MG/1
TABLET ORAL
Qty: 90 TABLET | Refills: 1 | Status: SHIPPED | OUTPATIENT
Start: 2020-04-06 | End: 2020-09-17 | Stop reason: SINTOL

## 2020-04-24 ENCOUNTER — OFFICE VISIT (OUTPATIENT)
Dept: PRIMARY CARE CLINIC | Age: 65
End: 2020-04-24
Payer: MEDICARE

## 2020-04-24 VITALS — TEMPERATURE: 97.8 F | OXYGEN SATURATION: 97 % | HEART RATE: 77 BPM

## 2020-04-24 PROCEDURE — 99213 OFFICE O/P EST LOW 20 MIN: CPT | Performed by: NURSE PRACTITIONER

## 2020-04-24 PROCEDURE — G8428 CUR MEDS NOT DOCUMENT: HCPCS | Performed by: NURSE PRACTITIONER

## 2020-04-24 PROCEDURE — 1036F TOBACCO NON-USER: CPT | Performed by: NURSE PRACTITIONER

## 2020-04-24 PROCEDURE — 4040F PNEUMOC VAC/ADMIN/RCVD: CPT | Performed by: NURSE PRACTITIONER

## 2020-04-24 PROCEDURE — 1123F ACP DISCUSS/DSCN MKR DOCD: CPT | Performed by: NURSE PRACTITIONER

## 2020-04-24 PROCEDURE — G8417 CALC BMI ABV UP PARAM F/U: HCPCS | Performed by: NURSE PRACTITIONER

## 2020-04-24 PROCEDURE — 3017F COLORECTAL CA SCREEN DOC REV: CPT | Performed by: NURSE PRACTITIONER

## 2020-04-24 NOTE — PROGRESS NOTES
4/24/2020    FLU/COVID-19 CLINIC EVALUATION    HPI- Chest tightness through to back. Has appt with cardiology on Monday. Sts pain is not increased with exertion. No dizziness or SOB, no cough.   Declines MD escudero at this time  SYMPTOMS:    Symptom duration, days:  [] 1   [] 2   [] 3   [x] 4   [] 5   [] 6   [] 7   [] 8   [] 9   [] 10   [] 11   [] 12   [] 13 [] 14 +      Symptom course:   [] Worsening     [x] Stable     [] Improving    [] Fevers  [] Symptom (not measured)  [] Measured (Result: )  [] Chills  [x] Cough  [] Coughing up blood  [] Productive  [] Dry  [] Chest Congestion  [x] Chest Tightness  [] Nasal Congestion  [] Runny Nose  [] Feeling short of breath  [] Fatigue  [] Chest pain  [] Headaches  []Tolerable  [] Severe  [x] Sore throat  [] Muscle aches  [] Nausea  [] Decreased appetite  [] Vomiting  []Unable to keep fluids down  [] Diarrhea  []Severe    [] OTHER SYMPTOMS:      RISK FACTORS:  [] Pregnant or possibly pregnant  [x] Age over 61  [] Diabetes  [x] Heart disease  [] Asthma  [] COPD/Other chronic lung diseases  [] Active Cancer  [] On Chemotherapy  [] Taking oral steroids  [] History Lymphoma/Leukemia  [] Close contact with a lab confirmed COVID-19 patient within 14 days of symptom onset  [] History of travel from affected geographical areas within 14 days of symptom onset  [] Health Care Worker Exposure no symptoms  [] Health Care Worker Exposure symptomatic      VITALS:  Vitals:    04/24/20 1811   Pulse: 77   Temp: 97.8 °F (36.6 °C)   SpO2: 97%        PHYSICAL EXAMINATION:  [x]Alert   [x]Oriented to person/place/time    []No apparent distress     []Toxic appearing    [x]Breathing appears normal     []Appears tachypneic         [x]Speaking in full sentences     TESTS:  POCT FLU:  [] Positive     []Negative  POCT STREP:  [] Positive     []Negative    [x] COVID-19 Test sent: [x] Blue   [] Red   [] Chest X-ray     ASSESSMENT:  [] Flu  [] Strep Throat  [] Uncertain Viral Respiratory Illness  [x] Possible COVID-19  [] Exposure COVID-19  [] Other:     PLAN:  [x] Discharge home with written instructions for:  [] Flu management  [] Strep throat management  [] Possible COVID-19 exposure with self-quarantine   [x] Possible COVID-19 with isolation instructions and management of symptoms  [] Follow-up with primary care physician or emergency department if worsens  [] Referred to emergency department for evaluation    [] Evaluation per physician/nurse practitioner in clinic  [] Prescription sent in  [x] No Prescription sent in   Diagnosis Orders   1. Exposure to COVID-19 virus  Covid-19 Ambulatory     COVID-19 swab complete at clinic and sent to lab for testing. Quarantine order in place, patient verbalized understanding. An  electronic signature was used to authenticate this note.      Mendoza Crews on 4/24/2020 at 6:12 PM

## 2020-04-26 LAB
SARS-COV-2: NOT DETECTED
SOURCE: NORMAL

## 2020-04-28 ENCOUNTER — TELEPHONE (OUTPATIENT)
Dept: PRIMARY CARE CLINIC | Age: 65
End: 2020-04-28

## 2020-08-03 NOTE — TELEPHONE ENCOUNTER
Last OV 10/10/19  Please advise  Thank you       Ref.  Range 10/10/2019 10:56   Cholesterol, Total Latest Ref Range: 0 - 199 mg/dL 147   HDL Cholesterol Latest Ref Range: 40 - 60 mg/dL 60   LDL Calculated Latest Ref Range: <100 mg/dL 65   Triglycerides Latest Ref Range: 0 - 150 mg/dL 108   VLDL Cholesterol Calculated Latest Ref Range: Not Established mg/dL 22

## 2020-08-04 RX ORDER — PRAVASTATIN SODIUM 40 MG
40 TABLET ORAL DAILY
Qty: 90 TABLET | Refills: 0 | Status: SHIPPED | OUTPATIENT
Start: 2020-08-04 | End: 2020-10-26

## 2020-09-17 ENCOUNTER — OFFICE VISIT (OUTPATIENT)
Dept: FAMILY MEDICINE CLINIC | Age: 65
End: 2020-09-17
Payer: MEDICARE

## 2020-09-17 VITALS
SYSTOLIC BLOOD PRESSURE: 138 MMHG | BODY MASS INDEX: 27.28 KG/M2 | WEIGHT: 159.8 LBS | TEMPERATURE: 97.9 F | DIASTOLIC BLOOD PRESSURE: 84 MMHG | HEIGHT: 64 IN | OXYGEN SATURATION: 95 % | HEART RATE: 93 BPM | RESPIRATION RATE: 16 BRPM

## 2020-09-17 PROCEDURE — G0008 ADMIN INFLUENZA VIRUS VAC: HCPCS | Performed by: FAMILY MEDICINE

## 2020-09-17 PROCEDURE — G0402 INITIAL PREVENTIVE EXAM: HCPCS | Performed by: FAMILY MEDICINE

## 2020-09-17 PROCEDURE — 90662 IIV NO PRSV INCREASED AG IM: CPT | Performed by: FAMILY MEDICINE

## 2020-09-17 PROCEDURE — 1123F ACP DISCUSS/DSCN MKR DOCD: CPT | Performed by: FAMILY MEDICINE

## 2020-09-17 PROCEDURE — 4040F PNEUMOC VAC/ADMIN/RCVD: CPT | Performed by: FAMILY MEDICINE

## 2020-09-17 PROCEDURE — 3017F COLORECTAL CA SCREEN DOC REV: CPT | Performed by: FAMILY MEDICINE

## 2020-09-17 ASSESSMENT — PATIENT HEALTH QUESTIONNAIRE - PHQ9
SUM OF ALL RESPONSES TO PHQ QUESTIONS 1-9: 0
SUM OF ALL RESPONSES TO PHQ9 QUESTIONS 1 & 2: 0
SUM OF ALL RESPONSES TO PHQ QUESTIONS 1-9: 0
2. FEELING DOWN, DEPRESSED OR HOPELESS: 0
1. LITTLE INTEREST OR PLEASURE IN DOING THINGS: 0

## 2020-09-17 NOTE — PATIENT INSTRUCTIONS
Personalized Preventive Plan for Keyana Bahena - 9/17/2020  Medicare offers a range of preventive health benefits. Some of the tests and screenings are paid in full while other may be subject to a deductible, co-insurance, and/or copay. Some of these benefits include a comprehensive review of your medical history including lifestyle, illnesses that may run in your family, and various assessments and screenings as appropriate. After reviewing your medical record and screening and assessments performed today your provider may have ordered immunizations, labs, imaging, and/or referrals for you. A list of these orders (if applicable) as well as your Preventive Care list are included within your After Visit Summary for your review. Other Preventive Recommendations:    · A preventive eye exam performed by an eye specialist is recommended every 1-2 years to screen for glaucoma; cataracts, macular degeneration, and other eye disorders. · A preventive dental visit is recommended every 6 months. · Try to get at least 150 minutes of exercise per week or 10,000 steps per day on a pedometer . · Order or download the FREE \"Exercise & Physical Activity: Your Everyday Guide\" from [employer]. Call 3-132.957.9592 or search [employer] online. · You need 7636-6010 mg of calcium and 1312-7463 IU of vitamin D per day. It is possible to meet your calcium requirement with diet alone, but a vitamin D supplement is usually necessary to meet this goal.  · When exposed to the sun, use a sunscreen that protects against both UVA and UVB radiation with an SPF of 30 or greater. Reapply every 2 to 3 hours or after sweating, drying off with a towel, or swimming. · Always wear a seat belt when traveling in a car. Always wear a helmet when riding a bicycle or motorcycle.

## 2020-09-17 NOTE — PROGRESS NOTES
Current Influenza vaccine VIS given to patient. Influenza consent form/questionnaire completed and signed. Patient responses to  Influenza consent form / questionnaire  reviewed. Vaccine given per protocol. Vaccine Information Sheet, \"Influenza - Inactivated\"  given to Magda Infante, or parent/legal guardian of  Magda Infante and verbalized understanding. Patient responses:    Have you ever had a reaction to a flu vaccine? No  Do you have any current illness? No  Have you ever had [place] Syndrome? No  Do you have a serious allergy to any of the follow: Neomycin, Polymyxin, Thimerosal, eggs or egg products? No    Flu vaccine given per order. Please see immunization tab. A.bowling    Risks and benefits explained. Current VIS given.

## 2020-09-17 NOTE — PROGRESS NOTES
Medicare Annual Wellness Visit  Name: Zita Panda Date: 2020   MRN: <[id number]> Sex: Male   Age: 71 y.o. Ethnicity: Non-/Non    : 1955 Race: Lorrene Bumpers is here for Annual Exam (flu shot high dose a.bowling)    Screenings for behavioral, psychosocial and functional/safety risks, and cognitive dysfunction are all negative except as indicated below. These results, as well as other patient data from the [address] form, are documented in Flowsheets linked to this Encounter. Last colonoscopy:   Last PSA:  Caffeine: tea 1/ day  Alcohol: 2-8 / week  Exercise: walking - 5-6 miles/ day     He usually gets phlebotomy q 6 months for hemochromatosis. He fell while mowing grass 2 months ago and hit his head while on a steep hill with wet grass. Unsure if had loss of consciousness , but if so not out long. Check 2 lump - 1 to upper right cheek and spoke with dermatologist, Dr. Salvatore Burton, who referred him to another specialist.     The second spot is to left upper chest - proximal clavicle . denies pain . Allergies   Allergen Reactions    Lisinopril Other (See Comments)     Cough           Prior to Visit Medications    Medication Sig Taking? Authorizing Provider   pravastatin (PRAVACHOL) 40 MG tablet Take 1 tablet by mouth daily FOLLOW UP LABS ARE NEEDED. Yes Dao Rice MD   losartan (COZAAR) 100 MG tablet Take 1 tablet by mouth daily Yes Dao Rice MD   hydrochlorothiazide (HYDRODIURIL) 25 MG tablet Take 1 tablet by mouth every morning Yes Dao Rice MD   ibuprofen (ADVIL;MOTRIN) 200 MG tablet Take 200 mg by mouth every 6 hours as needed for Pain Yes Historical Provider, MD   aspirin 81 MG tablet Take 81 mg by mouth daily. Yes Historical Provider, MD   tamsulosin (FLOMAX) 0.4 MG capsule Take 0.4 mg by mouth daily. Yes Historical Provider, MD   Crisaborole (EUCRISA) 2 % OINT Apply bid prn rash.   Patient not taking: Reported on 10/10/2019 Dao Rice MD   mometasone (ELOCON) 0.1 % cream Apply topically twice daily. Patient not taking: Reported on 10/10/2019  Sahara Sanders MD   nitroGLYCERIN (NITROSTAT) 0.4 MG SL tablet Place 1 tablet under the tongue every 5 minutes as needed for Chest pain  Patient not taking: Reported on 9/17/2020  Courtney Gray MD   Glucosamine-Chondroitin 500-400 MG CAPS Take 1 capsule by mouth daily.   Historical Provider, MD       Past Medical History:   Diagnosis Date    BPH (benign prostatic hyperplasia)     CAD (coronary artery disease)     Chronic prostatitis 1992, 4/17    DDD (degenerative disc disease), lumbar     L3- S1    Elevated fasting glucose 6/16    GERD (gastroesophageal reflux disease)     Hemochromatosis 2001    Hypochloremia 6/16    Hypokalemia 6/16    Leukocytopenia 6/16    Leukopenia     Lumbar disc herniation     L5-S1    Lumbar stenosis     per MRI 7/09; L2-S1    LV dysfunction 2/16    EF=40-45%    Melanoma in situ of back ([facility] [address] 3/07    Metatarsal stress fracture 11/95    Tear of MCL (medial collateral ligament) of knee 5/01    Unspecified essential hypertension        Past Surgical History:   Procedure Laterality Date    COLONOSCOPY  12/04    Dr. Jacki Couch      6/2012    [address]    right    LUMBAR LAMINECTOMY  9/09    PROSTATE BIOPSY  6/10    SKIN CANCER EXCISION  3/07    back; melanoma in-situ    UPPER GASTROINTESTINAL ENDOSCOPY  7/00    Dr. Stephani Chase       Family History   Problem Relation Age of Onset    Cancer Mother 80        esophageal    Heart Attack Father 78    Cancer Maternal Grandmother 80        esophageal    Heart Attack Maternal Grandfather     Seizures Sister     Mental Illness Brother        CareTeam (Including outside providers/suppliers regularly involved in providing care):   Patient Care Team:  Dao Rice MD as PCP - General (Family Medicine)  Trey Nathan MD as PCP - instructions/AVS.  . Recommended screening schedule for the next 5-10 years is provided to the patient in written form: see Patient Sharan Lung was seen today for annual exam.  Diagnoses and all orders for this visit:    Routine general medical examination at a health care facility  -     Comprehensive Metabolic Panel; Future  -     Lipid Panel; Future  -     Hemoglobin A1C; Future    Hereditary hemochromatosis ([facility] [address]         -    Followed by Hematologist and gets phlebotomy q 6 months . Coronary artery disease involving native coronary artery of native heart, angina presence unspecified  -     Comprehensive Metabolic Panel; Future  -     Lipid Panel; Future  -     Continue Pravastatin 40 mg qd and low cholesterol diet. -     Followed by Cardiologist.     DDD (degenerative disc disease), lumbar         -    Stable. Moist heat . ROM exercises. Modify activities. Essential hypertension        -     Controlled. Continue Losartan 100 mg qd, HCTZ 25 mg qd and low sodium diet. Elevated fasting glucose  -     Comprehensive Metabolic Panel; Future  -     Hemoglobin A1C; Future    Benign prostatic hyperplasia without lower urinary tract symptoms        -     Stable. Continue Flomax qd. -     Followed by Urologist.     Skin lesion of face  -     Referral - Adri Smith MD, Plastic Surgery, [facility]    Arthralgia of left acromioclavicular joint  -     XR CLAVICLE LEFT; Future and follow up after completed/ prn. Need for influenza vaccination  -     INFLUENZA, HIGH-DOSE, QUADV, 65 YRS +, IM, PF, 0.7ML (FLUZONE)    Follow up 6 months/ prn.

## 2020-09-18 ENCOUNTER — TELEPHONE (OUTPATIENT)
Dept: SURGERY | Age: 65
End: 2020-09-18

## 2020-09-18 NOTE — TELEPHONE ENCOUNTER
Patient referred by Dr. Audra Gaucher to see Dr. Fransisco Shahid regarding large growth on eyelid. Please call patient at [phone]. Thanks so much!

## 2020-09-21 ENCOUNTER — HOSPITAL ENCOUNTER (OUTPATIENT)
Dept: GENERAL RADIOLOGY | Age: 65
Discharge: HOME | End: 2020-09-21
Payer: MEDICARE

## 2020-09-21 ENCOUNTER — HOSPITAL ENCOUNTER (OUTPATIENT)
Age: 65
Discharge: HOME | End: 2020-09-21
Payer: MEDICARE

## 2020-09-21 PROCEDURE — 73000 X-RAY EXAM OF COLLAR BONE: CPT

## 2020-09-22 PROBLEM — M19.012 OSTEOARTHRITIS OF LEFT AC (ACROMIOCLAVICULAR) JOINT: Status: ACTIVE | Noted: 2020-09-01

## 2020-09-22 LAB
A/G RATIO: 2.9 (ref 1.1–2.2)
ALBUMIN SERPL-MCNC: 4.9 G/DL (ref 3.4–5)
ALP BLD-CCNC: 44 U/L (ref 40–129)
ALT SERPL-CCNC: 24 U/L (ref 10–40)
ANION GAP SERPL CALCULATED.3IONS-SCNC: 12 MMOL/L (ref 3–16)
AST SERPL-CCNC: 27 U/L (ref 15–37)
BILIRUB SERPL-MCNC: 1.3 MG/DL (ref 0–1)
BUN BLDV-MCNC: 5 MG/DL (ref 7–20)
CALCIUM SERPL-MCNC: 9.7 MG/DL (ref 8.3–10.6)
CHLORIDE BLD-SCNC: 88 MMOL/L (ref 99–110)
CHOLESTEROL, TOTAL: 146 MG/DL (ref 0–199)
CO2: 26 MMOL/L (ref 21–32)
CREAT SERPL-MCNC: 0.6 MG/DL (ref 0.8–1.3)
GFR AFRICAN AMERICAN: >60
GFR NON-AFRICAN AMERICAN: >60
GLOBULIN: 1.7 G/DL
GLUCOSE BLD-MCNC: 97 MG/DL (ref 70–99)
HDLC SERPL-MCNC: 76 MG/DL (ref 40–60)
LDL CHOLESTEROL CALCULATED: 60 MG/DL
POTASSIUM SERPL-SCNC: 4.5 MMOL/L (ref 3.5–5.1)
SODIUM BLD-SCNC: 126 MMOL/L (ref 136–145)
TOTAL PROTEIN: 6.6 G/DL (ref 6.4–8.2)
TRIGL SERPL-MCNC: 52 MG/DL (ref 0–150)
VLDLC SERPL CALC-MCNC: 10 MG/DL

## 2020-09-23 LAB
ESTIMATED AVERAGE GLUCOSE: 73.8 MG/DL
HBA1C MFR BLD: 4.2 %

## 2020-09-28 ENCOUNTER — OFFICE VISIT (OUTPATIENT)
Dept: SURGERY | Age: 65
End: 2020-09-28
Payer: MEDICARE

## 2020-09-28 VITALS
HEART RATE: 83 BPM | BODY MASS INDEX: 27.83 KG/M2 | WEIGHT: 163 LBS | OXYGEN SATURATION: 99 % | HEIGHT: 64 IN | SYSTOLIC BLOOD PRESSURE: 138 MMHG | DIASTOLIC BLOOD PRESSURE: 83 MMHG | RESPIRATION RATE: 16 BRPM | TEMPERATURE: 96.8 F

## 2020-09-28 DIAGNOSIS — E87.1 HYPONATREMIA: ICD-10-CM

## 2020-09-28 LAB
ALBUMIN SERPL-MCNC: 4.6 G/DL (ref 3.4–5)
ANION GAP SERPL CALCULATED.3IONS-SCNC: 11 MMOL/L (ref 3–16)
BUN BLDV-MCNC: 7 MG/DL (ref 7–20)
CALCIUM SERPL-MCNC: 9.6 MG/DL (ref 8.3–10.6)
CHLORIDE BLD-SCNC: 98 MMOL/L (ref 99–110)
CO2: 24 MMOL/L (ref 21–32)
CREAT SERPL-MCNC: 0.6 MG/DL (ref 0.8–1.3)
GFR AFRICAN AMERICAN: >60
GFR NON-AFRICAN AMERICAN: >60
GLUCOSE BLD-MCNC: 96 MG/DL (ref 70–99)
OSMOLALITY URINE: 433 MOSM/KG (ref 390–1070)
PHOSPHORUS: 3.5 MG/DL (ref 2.5–4.9)
POTASSIUM SERPL-SCNC: 4.6 MMOL/L (ref 3.5–5.1)
SODIUM BLD-SCNC: 133 MMOL/L (ref 136–145)
SODIUM URINE: 69 MMOL/L

## 2020-09-28 PROCEDURE — 1036F TOBACCO NON-USER: CPT | Performed by: SURGERY

## 2020-09-28 PROCEDURE — 1123F ACP DISCUSS/DSCN MKR DOCD: CPT | Performed by: SURGERY

## 2020-09-28 PROCEDURE — 3017F COLORECTAL CA SCREEN DOC REV: CPT | Performed by: SURGERY

## 2020-09-28 PROCEDURE — 99203 OFFICE O/P NEW LOW 30 MIN: CPT | Performed by: SURGERY

## 2020-09-28 PROCEDURE — G8417 CALC BMI ABV UP PARAM F/U: HCPCS | Performed by: SURGERY

## 2020-09-28 PROCEDURE — 4040F PNEUMOC VAC/ADMIN/RCVD: CPT | Performed by: SURGERY

## 2020-09-28 PROCEDURE — G8427 DOCREV CUR MEDS BY ELIG CLIN: HCPCS | Performed by: SURGERY

## 2020-09-28 NOTE — PROGRESS NOTES
Lisinopril Other (See Comments)     Cough       SHx:   Social History     Socioeconomic History    Marital status:      Spouse name: Not on file    Number of children: Not on file    Years of education: Not on file    Highest education level: Not on file   Occupational History    Not on file   Social Needs    Financial resource strain: Not on file    Food insecurity     Worry: Not on file     Inability: Not on file    Transportation needs     Medical: Not on file     Non-medical: Not on file   Tobacco Use    Smoking status: Never Smoker    Smokeless tobacco: Never Used   Substance and Sexual Activity    Alcohol use: Yes     Alcohol/week: 8.0 standard drinks     Types: 8 Cans of beer per week    Drug use: No    Sexual activity: Yes     Partners: Female   Lifestyle    Physical activity     Days per week: Not on file     Minutes per session: Not on file    Stress: Not on file   Relationships    Social connections     Talks on phone: Not on file     Gets together: Not on file     Attends Christian service: Not on file     Active member of club or organization: Not on file     Attends meetings of clubs or organizations: Not on file     Relationship status: Not on file    Intimate partner violence     Fear of current or ex partner: Not on file     Emotionally abused: Not on file     Physically abused: Not on file     Forced sexual activity: Not on file   Other Topics Concern    Not on file   Social History Narrative    Not on file     FHx: Family history of skin CA: Multiple   Meds:   Current Outpatient Medications   Medication Sig Dispense Refill    pravastatin (PRAVACHOL) 40 MG tablet Take 1 tablet by mouth daily FOLLOW UP LABS ARE NEEDED. 90 tablet 0    losartan (COZAAR) 100 MG tablet Take 1 tablet by mouth daily 90 tablet 1    hydrochlorothiazide (HYDRODIURIL) 25 MG tablet Take 1 tablet by mouth every morning 90 tablet 1    Crisaborole (EUCRISA) 2 % OINT Apply bid prn rash.  (Patient not taking: Reported on 10/10/2019) 60 g 2    mometasone (ELOCON) 0.1 % cream Apply topically twice daily. (Patient not taking: Reported on 10/10/2019) 45 g 0    nitroGLYCERIN (NITROSTAT) 0.4 MG SL tablet Place 1 tablet under the tongue every 5 minutes as needed for Chest pain (Patient not taking: Reported on 9/17/2020) 25 tablet 1    ibuprofen (ADVIL;MOTRIN) 200 MG tablet Take 200 mg by mouth every 6 hours as needed for Pain      aspirin 81 MG tablet Take 81 mg by mouth daily.  tamsulosin (FLOMAX) 0.4 MG capsule Take 0.4 mg by mouth daily.  Glucosamine-Chondroitin 500-400 MG CAPS Take 1 capsule by mouth daily. No current facility-administered medications for this visit. ROS   Constitutional: Negative for chills and fever. HENT: Negative for congestion, facial swelling, and voice change. Eyes: Negative for photophobia and visual disturbance. Respiratory: Negative for apnea, cough, chest tightness and shortness of breath. Cardiovascular: Negative for chest pain and palpitations. Gastrointestinal: Negative for dysphagia and early satiety. Genitourinary: Negative for difficulty urinating, dysuria, flank pain, frequency and hematuria. Musculoskeletal: Negative for new gait problem, joint swelling and myalgias. Skin: See HPI. Endocrine: negative for tremors, temperature intolerance or polydipsia. Allergic/Immunologic: Negative for new environmental or food allergies. Neurological: Negative for dizziness, seizures, speech difficulty, numbness. Hematological: Negative for adenopathy. Psychiatric/Behavioral: Negative for agitation and confusion. EXAM    /83   Pulse 83   Temp 96.8 °F (36 °C)   Resp 16   Ht 5' 4\" (1.626 m)   Wt 163 lb (73.9 kg)   SpO2 99%   BMI 27.98 kg/m²       Constitutional: Patient is oriented to person, place, and time. Vital signs are normal. Patient  appears well-developed and well-nourished. Patient  is active and cooperative.   Non-toxic appearance. No distress. Neck: Trachea normal and normal range of motion. Neck supple. No JVD present. No tracheal tenderness present. Carotid bruit is not present. No rigidity. No tracheal deviation and no edema present. No thyromegaly present. Cardiovascular: Normal rate, regular rhythm, normal heart sounds, intact distal pulses and normal pulses. Pulmonary/Chest: Effort normal and breath sounds normal. No stridor. No respiratory distress. Patient  has no wheezes. Patient has no rales. Patient exhibits no tenderness and no crepitus. Abdominal: Soft. Normal appearance and bowel sounds are normal. Patient exhibits no distension, no abdominal bruit, no ascites and no mass. There is no hepatosplenomegaly. There is no tenderness. There is no rigidity, no rebound, no guarding and no CVA tenderness. Musculoskeletal: Normal range of motion. Patient exhibits no edema or tenderness. Neurological: Patient is alert and oriented to person, place, and time. Patient has normal strength. Coordination and gait normal. GCS eye subscore is 4. GCS verbal subscore is 5. GCS motor subscore is 6. Skin: Skin is warm and dry. No abrasion and no rash noted. Patient  is not diaphoretic. No cyanosis or erythema. 1 x 0.6 cm right lower lid lesion just lateral to the lateral limbus  Feels adherent to the skin  Psychiatric: Patient has a normal mood and affect. speech is normal and behavior is normal. Cognition and memory are normal.     PATHOLOGY/WORKUP: None    IMP: 65 y.o.male with right lower lid lesion. PLAN: Would benefit from excision of his right lower lid lesion with likely full thickness skin graft / primary closure. If he were to develop an ectropion, than would plan for cervicofacial advancement flap. He would ideally like to be managed nonoperatively if possible. Thus, will have him present back in 3 months for re-evaluation and measurements.   If his lesions has symptoms and or changed in size, would then plan for surgical intervention as described above. The patient was counseled at length about the risks of anjelica Covid-19 during their perioperative period and any recovery window from their procedure. The patient was made aware that anjelica Covid-19  may worsen their prognosis for recovering from their procedure  and lend to a higher morbidity and/or mortality risk. All material risks, benefits, and reasonable alternatives including postponing the procedure were discussed. The patient does wish to proceed with the procedure at this time.     Tucker Ryan MD  [address] [place] [address] Reconstructive Surgery  [phone]  09/28/20

## 2020-10-19 RX ORDER — LOSARTAN POTASSIUM 100 MG/1
TABLET ORAL
Qty: 90 TABLET | Refills: 1 | Status: SHIPPED | OUTPATIENT
Start: 2020-10-19 | End: 2021-04-14

## 2020-10-19 RX ORDER — HYDROCHLOROTHIAZIDE 25 MG/1
TABLET ORAL
Qty: 90 TABLET | Refills: 1 | Status: SHIPPED | OUTPATIENT
Start: 2020-10-19 | End: 2022-10-13 | Stop reason: ALTCHOICE

## 2020-12-04 ENCOUNTER — TELEPHONE (OUTPATIENT)
Dept: FAMILY MEDICINE CLINIC | Age: 65
End: 2020-12-04

## 2020-12-04 ENCOUNTER — VIRTUAL VISIT (OUTPATIENT)
Dept: FAMILY MEDICINE CLINIC | Age: 65
End: 2020-12-04
Payer: MEDICARE

## 2020-12-04 PROCEDURE — 4040F PNEUMOC VAC/ADMIN/RCVD: CPT | Performed by: FAMILY MEDICINE

## 2020-12-04 PROCEDURE — 1123F ACP DISCUSS/DSCN MKR DOCD: CPT | Performed by: FAMILY MEDICINE

## 2020-12-04 PROCEDURE — 99214 OFFICE O/P EST MOD 30 MIN: CPT | Performed by: FAMILY MEDICINE

## 2020-12-04 PROCEDURE — G8427 DOCREV CUR MEDS BY ELIG CLIN: HCPCS | Performed by: FAMILY MEDICINE

## 2020-12-04 PROCEDURE — 3017F COLORECTAL CA SCREEN DOC REV: CPT | Performed by: FAMILY MEDICINE

## 2020-12-04 NOTE — TELEPHONE ENCOUNTER
If symptoms persist or recur, advise to go to ED. If symptoms have resolved, schedule a VV appointment today.

## 2020-12-04 NOTE — TELEPHONE ENCOUNTER
Patient's wife calling today stating patient was very confused. She states he had a 'brown out', which he has had twice before with last one being about 5 years ago. He could not remember recent events. He had some dizziness. Some nausea. He does not recall if he had a headache or any other pain prior to this event. He states he had a period of time where he doesn't recall, he was on the phone with a friend around 6 and didn't remember anything until approximately 11:30. BP was 97/60 @ 11:30. This happened at [facility]    Patient's wife checked to see if there was evidence of stroke and all limbs, face, etc checked out fine. Medication had been changed in the past and this has happened. No recent changes in medication.     [facility] [phone]

## 2020-12-04 NOTE — PROGRESS NOTES
2020    TELEHEALTH EVALUATION -- Audio/Visual (During DFZGE-92 public health emergency)    Due to COVID 19 outbreak, patient's office visit was converted to a virtual visit. Patient was contacted and agreed to proceed with a virtual visit via [email or web address] me  The risks and benefits of converting to a virtual visit were discussed in light of the current infectious disease epidemic. Patient also understood that insurance coverage and co-pays are up to their individual insurance plans. HPI:    Stefan Waddell (:  1955) has requested an audio/video evaluation for the following concern(s):    He was to go golfing with buddies and had taken a shower . He was waiting to hear where the T-time and location was. He does not recall the phone conversation at all. No loss of consciousness . His wife noticed he was confused . He did not recall the kids being home from [facility]. Able to move all extremities. No speech difficulties . He was more confused. No visual difficulties noted or recalled. He was confused for about 30 minutes or so. Less confused initially . He was having dizziness for 1 hour. He does not recall palpitations or shortness of breath . No bowel incontinence. Some bladder incontinence. His blood pressure was =  97/65, P = 90 during the episode within 15 minutes of symptoms. He received phone call at 11:03 and was a 6 minute call. Vitals were taken at 11:25 am.     BP = 159/100, P = 83 when wife took it while on the Video visit. Repeat blood pressure while on VV was =  140/82   , P =  88    . Review of Systems    Prior to Visit Medications    Medication Sig Taking?  Authorizing Provider   pravastatin (PRAVACHOL) 40 MG tablet Take 1 tablet by mouth daily Yes Lilly Josehp MD   losartan (COZAAR) 100 MG tablet TAKE 1 TABLET BY MOUTH EVERY DAY Yes Lilly Joseph MD   hydroCHLOROthiazide (HYDRODIURIL) 25 MG tablet TAKE 1 TABLET BY MOUTH EVERY DAY IN THE MORNING  Patient taking differently: 12.5 mg  Yes Sara Fernández MD   ibuprofen (ADVIL;MOTRIN) 200 MG tablet Take 200 mg by mouth every 6 hours as needed for Pain Yes Historical Provider, MD   aspirin 81 MG tablet Take 81 mg by mouth daily. Yes Historical Provider, MD   tamsulosin (FLOMAX) 0.4 MG capsule Take 0.4 mg by mouth daily. Yes Historical Provider, MD   Glucosamine-Chondroitin 500-400 MG CAPS Take 1 capsule by mouth daily. Yes Historical Provider, MD   Crisaborole (EUCRISA) 2 % OINT Apply bid prn rash. Patient not taking: Reported on 10/10/2019  Sara Fernández MD   mometasone (ELOCON) 0.1 % cream Apply topically twice daily. Patient not taking: Reported on 10/10/2019  Maurizio Chaudhary MD   nitroGLYCERIN (NITROSTAT) 0.4 MG SL tablet Place 1 tablet under the tongue every 5 minutes as needed for Chest pain  Patient not taking: Reported on 9/17/2020  Dionna Milan MD       Allergies   Allergen Reactions    Lisinopril Other (See Comments)     Cough         Social History     Tobacco Use    Smoking status: Never Smoker    Smokeless tobacco: Never Used   Substance Use Topics    Alcohol use:  Yes     Alcohol/week: 8.0 standard drinks     Types: 8 Cans of beer per week    Drug use: No        Past Medical History:   Diagnosis Date    BPH (benign prostatic hyperplasia)     CAD (coronary artery disease)     Chronic prostatitis 1992, 4/17    DDD (degenerative disc disease), lumbar     L3- S1    Elevated fasting glucose 6/16    GERD (gastroesophageal reflux disease)     Hemochromatosis 2001    Hypochloremia 6/16    Hypokalemia 6/16    Leukocytopenia 6/16    Leukopenia     Lumbar disc herniation     L5-S1    Lumbar stenosis     per MRI 7/09; L2-S1    LV dysfunction 2/16    EF=40-45%    Melanoma in situ of back ([facility] [address] 3/07    Metatarsal stress fracture 11/95    Osteoarthritis of left AC (acromioclavicular) joint 09/2020    Tear of MCL (medial collateral ligament) of knee 5/01    Unspecified essential hypertension Past Surgical History:   Procedure Laterality Date    COLONOSCOPY  12/04    Dr. Ben Martinez      6/2012    [address]    right    LUMBAR LAMINECTOMY  9/09    PROSTATE BIOPSY  6/10    SKIN CANCER EXCISION  3/07    back; melanoma in-situ    UPPER GASTROINTESTINAL ENDOSCOPY  7/00    Dr. Betsy Florez Maintenance   Topic Date Due    Shingles Vaccine (2 of 3) 07/20/2016    Pneumococcal 65+ years Vaccine (2 of 2 - PPSV23) 10/10/2020    Annual Wellness Visit (AWV)  09/18/2021    A1C test (Diabetic or Prediabetic)  09/22/2021    Lipid screen  09/22/2021    Potassium monitoring  09/28/2021    Creatinine monitoring  09/28/2021    Colon cancer screen colonoscopy  07/29/2025    DTaP/Tdap/Td vaccine (3 - Td) 03/12/2028    Flu vaccine  Completed    Hepatitis C screen  Completed    Hepatitis A vaccine  Aged Out    Hepatitis B vaccine  Aged Out    Hib vaccine  Aged Out    Meningococcal (ACWY) vaccine  Aged Out       Family History   Problem Relation Age of Onset    Cancer Mother 80        esophageal    Heart Attack Father 78    Cancer Maternal Grandmother 92        esophageal    Heart Attack Maternal Grandfather     Seizures Sister     Mental Illness Brother        PHYSICAL EXAMINATION:    Vital Signs: (As obtained by patient/caregiver or practitioner observation)     Patient-Reported Vitals 12/4/2020   Patient-Reported Weight 163lb   Patient-Reported Height 5ft4in   Patient-Reported Systolic 968   Patient-Reported Diastolic 89   Patient-Reported Pulse 90   Patient-Reported Temperature 97.7      Respiratory rate= 14      Constitutional:  Appears well-developed and well-nourished. No apparent distress                              Mental status:  Alert and awake. Oriented to person/place/time. Able to follow commands       Eyes: EOM intact. Sclera-normal. No erythema of conjunctiva. No eye discharge.      HENT: Normocephalic, Act, 1135 waiver authority and the Coronavirus Preparedness and Response Supplemental Appropriations Act, this Virtual  Visit was conducted, with patient's consent, to reduce the patient's risk of exposure to COVID-19 and provide continuity of care for an established patient. Services were provided through a video synchronous discussion virtually to substitute for in-person clinic visit.

## 2020-12-24 ENCOUNTER — TELEPHONE (OUTPATIENT)
Dept: FAMILY MEDICINE CLINIC | Age: 65
End: 2020-12-24

## 2021-01-04 ENCOUNTER — OFFICE VISIT (OUTPATIENT)
Dept: SURGERY | Age: 66
End: 2021-01-04
Payer: MEDICARE

## 2021-01-04 VITALS
OXYGEN SATURATION: 98 % | BODY MASS INDEX: 27.66 KG/M2 | WEIGHT: 162 LBS | HEIGHT: 64 IN | TEMPERATURE: 97.2 F | SYSTOLIC BLOOD PRESSURE: 151 MMHG | DIASTOLIC BLOOD PRESSURE: 89 MMHG | HEART RATE: 87 BPM

## 2021-01-04 DIAGNOSIS — L98.9 FACIAL SKIN LESION: Primary | ICD-10-CM

## 2021-01-04 PROCEDURE — G8417 CALC BMI ABV UP PARAM F/U: HCPCS | Performed by: SURGERY

## 2021-01-04 PROCEDURE — G8427 DOCREV CUR MEDS BY ELIG CLIN: HCPCS | Performed by: SURGERY

## 2021-01-04 PROCEDURE — 4040F PNEUMOC VAC/ADMIN/RCVD: CPT | Performed by: SURGERY

## 2021-01-04 PROCEDURE — 1123F ACP DISCUSS/DSCN MKR DOCD: CPT | Performed by: SURGERY

## 2021-01-04 PROCEDURE — 1036F TOBACCO NON-USER: CPT | Performed by: SURGERY

## 2021-01-04 PROCEDURE — 99213 OFFICE O/P EST LOW 20 MIN: CPT | Performed by: SURGERY

## 2021-01-04 PROCEDURE — 3017F COLORECTAL CA SCREEN DOC REV: CPT | Performed by: SURGERY

## 2021-01-04 PROCEDURE — G8484 FLU IMMUNIZE NO ADMIN: HCPCS | Performed by: SURGERY

## 2021-01-04 NOTE — PROGRESS NOTES
[facility]    CC: Facial lesions    Referring Physician: Jonathan Lawson MD    HPI: This is an 76 y.o.male with a PMHx as delineated below who presents to clinic in consultation for a right lower lid lesion. The patient noticed the lesion for approximately 1 year, but notes that it has grown fairly quickly in that time frame. He notes no pain that has worsened. There has not been any improvement with time. He notes that it does not affect his vision. He was seen by dermatology who recommended referral for excision and thus he was referred by Dr. Rudolph Fitch to plastic surgery for evaluation and treatment. Of note, the patient has a fairly extensive history of skin cancers including melanoma. Since his last evaluation, he notes that there has not been any changes to the lesion. However, he has developed a new lateral cheek lesion without any other symptoms. He presents back for scheduled re-evaluation of the lesion.     PMHx:   Past Medical History:   Diagnosis Date    BPH (benign prostatic hyperplasia)     CAD (coronary artery disease)     Chronic prostatitis 1992, 4/17    DDD (degenerative disc disease), lumbar     L3- S1    Elevated fasting glucose 6/16    GERD (gastroesophageal reflux disease)     Hemochromatosis 2001    Hypochloremia 6/16    Hypokalemia 6/16    Leukocytopenia 6/16    Leukopenia     Lumbar disc herniation     L5-S1    Lumbar stenosis     per MRI 7/09; L2-S1    LV dysfunction 2/16    EF=40-45%    Melanoma in situ of back ([address] 3/07    Metatarsal stress fracture 11/95    Osteoarthritis of left AC (acromioclavicular) joint 09/2020    Tear of MCL (medial collateral ligament) of knee 5/01    Unspecified essential hypertension    hereditary hemochromisis  CHF (EF 40-45%)    PSHx:   Past Surgical History:   Procedure Laterality Date    COLONOSCOPY  12/04    Dr. Cayla Overton      6/2012    INGUINAL HERNIA REPAIR 1993    right    LUMBAR LAMINECTOMY  9/09    PROSTATE BIOPSY  6/10    SKIN CANCER EXCISION  3/07    back; melanoma in-situ    UPPER GASTROINTESTINAL ENDOSCOPY  7/00    Dr. Cindy Canas     Allergy:   Allergies   Allergen Reactions    Lisinopril Other (See Comments)     Cough       SHx:   Social History     Socioeconomic History    Marital status:      Spouse name: Not on file    Number of children: Not on file    Years of education: Not on file    Highest education level: Not on file   Occupational History    Not on file   Social Needs    Financial resource strain: Not on file    Food insecurity     Worry: Not on file     Inability: Not on file    Transportation needs     Medical: Not on file     Non-medical: Not on file   Tobacco Use    Smoking status: Never Smoker    Smokeless tobacco: Never Used   Substance and Sexual Activity    Alcohol use:  Yes     Alcohol/week: 8.0 standard drinks     Types: 8 Cans of beer per week    Drug use: No    Sexual activity: Yes     Partners: Female   Lifestyle    Physical activity     Days per week: Not on file     Minutes per session: Not on file    Stress: Not on file   Relationships    Social connections     Talks on phone: Not on file     Gets together: Not on file     Attends Jewish service: Not on file     Active member of club or organization: Not on file     Attends meetings of clubs or organizations: Not on file     Relationship status: Not on file    Intimate partner violence     Fear of current or ex partner: Not on file     Emotionally abused: Not on file     Physically abused: Not on file     Forced sexual activity: Not on file   Other Topics Concern    Not on file   Social History Narrative    Not on file     FHx: Family history of skin CA: Multiple   Meds:   Current Outpatient Medications   Medication Sig Dispense Refill    pravastatin (PRAVACHOL) 40 MG tablet Take 1 tablet by mouth daily 90 tablet 1    losartan (COZAAR) 100 MG tablet TAKE 1 TABLET BY MOUTH EVERY DAY 90 tablet 1    hydroCHLOROthiazide (HYDRODIURIL) 25 MG tablet TAKE 1 TABLET BY MOUTH EVERY DAY IN THE MORNING (Patient taking differently: 12.5 mg ) 90 tablet 1    Crisaborole (EUCRISA) 2 % OINT Apply bid prn rash. (Patient not taking: Reported on 10/10/2019) 60 g 2    mometasone (ELOCON) 0.1 % cream Apply topically twice daily. (Patient not taking: Reported on 10/10/2019) 45 g 0    nitroGLYCERIN (NITROSTAT) 0.4 MG SL tablet Place 1 tablet under the tongue every 5 minutes as needed for Chest pain (Patient not taking: Reported on 9/17/2020) 25 tablet 1    ibuprofen (ADVIL;MOTRIN) 200 MG tablet Take 200 mg by mouth every 6 hours as needed for Pain      aspirin 81 MG tablet Take 81 mg by mouth daily.  tamsulosin (FLOMAX) 0.4 MG capsule Take 0.4 mg by mouth daily.  Glucosamine-Chondroitin 500-400 MG CAPS Take 1 capsule by mouth daily. No current facility-administered medications for this visit. ROS   Constitutional: Negative for chills and fever. HENT: Negative for congestion, facial swelling, and voice change. Eyes: Negative for photophobia and visual disturbance. Respiratory: Negative for apnea, cough, chest tightness and shortness of breath. Cardiovascular: Negative for chest pain and palpitations. Gastrointestinal: Negative for dysphagia and early satiety. Genitourinary: Negative for difficulty urinating, dysuria, flank pain, frequency and hematuria. Musculoskeletal: Negative for new gait problem, joint swelling and myalgias. Skin: See HPI. Endocrine: negative for tremors, temperature intolerance or polydipsia. Allergic/Immunologic: Negative for new environmental or food allergies. Neurological: Negative for dizziness, seizures, speech difficulty, numbness. Hematological: Negative for adenopathy. Psychiatric/Behavioral: Negative for agitation and confusion.     EXAM    BP (!) 151/89   Pulse 87   Temp 97.2 °F (36.2 °C) (Temporal) Ht 5' 4\" (1.626 m)   Wt 162 lb (73.5 kg)   SpO2 98%   BMI 27.81 kg/m²     Constitutional: Patient is oriented to person, place, and time. Vital signs are normal. Patient  appears well-developed and well-nourished. Patient  is active and cooperative. Non-toxic appearance. No distress. Neck: Trachea normal and normal range of motion. Neck supple. No JVD present. No tracheal tenderness present. Carotid bruit is not present. No rigidity. No tracheal deviation and no edema present. No thyromegaly present. Cardiovascular: Normal rate, regular rhythm, normal heart sounds, intact distal pulses and normal pulses. Pulmonary/Chest: Effort normal and breath sounds normal. No stridor. No respiratory distress. Patient  has no wheezes. Patient has no rales. Patient exhibits no tenderness and no crepitus. Abdominal: Soft. Normal appearance and bowel sounds are normal. Patient exhibits no distension, no abdominal bruit, no ascites and no mass. There is no hepatosplenomegaly. There is no tenderness. There is no rigidity, no rebound, no guarding and no CVA tenderness. Musculoskeletal: Normal range of motion. Patient exhibits no edema or tenderness. Neurological: Patient is alert and oriented to person, place, and time. Patient has normal strength. Coordination and gait normal. GCS eye subscore is 4. GCS verbal subscore is 5. GCS motor subscore is 6. Skin: Skin is warm and dry. No abrasion and no rash noted. Patient  is not diaphoretic. No cyanosis or erythema. 0.8 x 0.8 cm right lower lid lesion just lateral to the lateral limbus  Feels adherent to the skin  New right lateral lesion approximately 1 x 0.8 cm in line, but superior to the tragus  Psychiatric: Patient has a normal mood and affect. speech is normal and behavior is normal. Cognition and memory are normal.     PATHOLOGY/WORKUP: None    IMP: 65 y.o.male with right lower lid lesion.   PLAN: Would benefit from excision of his right lower lid lesion with likely full thickness skin graft / primary closure. If he were to develop an ectropion, than would plan for cervicofacial advancement flap. He would ideally like to be managed nonoperatively if possible. Thus, will have him present back in an additional 6 months for re-evaluation and measurements. If his lesions has symptoms and or changed in size, would then plan for surgical intervention as described above. The patient was counseled at length about the risks of anjelica Covid-19 during their perioperative period and any recovery window from their procedure. The patient was made aware that anjelica Covid-19  may worsen their prognosis for recovering from their procedure  and lend to a higher morbidity and/or mortality risk. All material risks, benefits, and reasonable alternatives including postponing the procedure were discussed. The patient does wish to proceed with the procedure at this time.     Becky Singh MD  [facility] [address] [place] [address] Reconstructive Surgery  [phone]  01/04/21

## 2021-01-18 RX ORDER — AMLODIPINE BESYLATE 10 MG/1
TABLET ORAL
Qty: 90 TABLET | Refills: 1 | Status: SHIPPED | OUTPATIENT
Start: 2021-01-18 | End: 2021-07-15

## 2021-01-18 NOTE — TELEPHONE ENCOUNTER
Requested Prescriptions     Pending Prescriptions Disp Refills    amLODIPine (NORVASC) 10 MG tablet [Pharmacy Med Name: AMLODIPINE BESYLATE 10 MG TAB] 30 tablet      Sig: TAKE 1 TABLET BY MOUTH EVERY DAY     Last vv 12/04/20  Last labs 09/22/20

## 2021-04-14 DIAGNOSIS — I10 ESSENTIAL HYPERTENSION: ICD-10-CM

## 2021-04-14 RX ORDER — LOSARTAN POTASSIUM 100 MG/1
TABLET ORAL
Qty: 90 TABLET | Refills: 1 | Status: SHIPPED | OUTPATIENT
Start: 2021-04-14 | End: 2021-10-11

## 2021-04-19 RX ORDER — PRAVASTATIN SODIUM 40 MG
TABLET ORAL
Qty: 90 TABLET | Refills: 1 | Status: SHIPPED | OUTPATIENT
Start: 2021-04-19 | End: 2021-10-11

## 2021-04-19 NOTE — TELEPHONE ENCOUNTER
Requested Prescriptions     Pending Prescriptions Disp Refills    pravastatin (PRAVACHOL) 40 MG tablet [Pharmacy Med Name: PRAVASTATIN SODIUM 40 MG TAB] 90 tablet 1     Sig: TAKE 1 TABLET BY MOUTH EVERY DAY     LAST OV 09/17/2020    LAST LAB 09/22/2020    SC CMA

## 2021-05-07 ENCOUNTER — VIRTUAL VISIT (OUTPATIENT)
Dept: FAMILY MEDICINE CLINIC | Age: 66
End: 2021-05-07
Payer: MEDICARE

## 2021-05-07 ENCOUNTER — TELEPHONE (OUTPATIENT)
Dept: FAMILY MEDICINE CLINIC | Age: 66
End: 2021-05-07

## 2021-05-07 DIAGNOSIS — J01.90 ACUTE BACTERIAL SINUSITIS: Primary | ICD-10-CM

## 2021-05-07 DIAGNOSIS — B96.89 ACUTE BACTERIAL SINUSITIS: Primary | ICD-10-CM

## 2021-05-07 PROCEDURE — 3017F COLORECTAL CA SCREEN DOC REV: CPT | Performed by: FAMILY MEDICINE

## 2021-05-07 PROCEDURE — 1036F TOBACCO NON-USER: CPT | Performed by: FAMILY MEDICINE

## 2021-05-07 PROCEDURE — 99213 OFFICE O/P EST LOW 20 MIN: CPT | Performed by: FAMILY MEDICINE

## 2021-05-07 PROCEDURE — G8417 CALC BMI ABV UP PARAM F/U: HCPCS | Performed by: FAMILY MEDICINE

## 2021-05-07 PROCEDURE — G8427 DOCREV CUR MEDS BY ELIG CLIN: HCPCS | Performed by: FAMILY MEDICINE

## 2021-05-07 PROCEDURE — 4040F PNEUMOC VAC/ADMIN/RCVD: CPT | Performed by: FAMILY MEDICINE

## 2021-05-07 PROCEDURE — 1123F ACP DISCUSS/DSCN MKR DOCD: CPT | Performed by: FAMILY MEDICINE

## 2021-05-07 RX ORDER — LEVOFLOXACIN 500 MG/1
500 TABLET, FILM COATED ORAL DAILY
Qty: 10 TABLET | Refills: 0 | Status: SHIPPED | OUTPATIENT
Start: 2021-05-07 | End: 2021-05-17

## 2021-05-07 RX ORDER — PREDNISONE 20 MG/1
20 TABLET ORAL DAILY
Qty: 5 TABLET | Refills: 0 | Status: SHIPPED | OUTPATIENT
Start: 2021-05-07 | End: 2021-05-12

## 2021-05-07 ASSESSMENT — PATIENT HEALTH QUESTIONNAIRE - PHQ9
SUM OF ALL RESPONSES TO PHQ QUESTIONS 1-9: 0
2. FEELING DOWN, DEPRESSED OR HOPELESS: 0
SUM OF ALL RESPONSES TO PHQ QUESTIONS 1-9: 0
SUM OF ALL RESPONSES TO PHQ QUESTIONS 1-9: 0

## 2021-05-07 NOTE — TELEPHONE ENCOUNTER
----- Message from Nancy Jeff sent at 5/7/2021  9:02 AM EDT -----  Subject: Appointment Request    Reason for Call: Urgent Cough Cold    QUESTIONS  Type of Appointment? Established Patient  Reason for appointment request? No appointments available during search  Additional Information for Provider? PT wants to schedule an appt. They   state they have congestion, fever, fatigue, nauseous, and loss of   appetite. They state the symptoms have been present for 10 days. PT   screened red for covid. PT also noted they have had 2 covid vaccines. There were not any available appt for PT today.   ---------------------------------------------------------------------------  --------------  CALL BACK INFO  What is the best way for the office to contact you? OK to leave message on   [employer], OK to respond with electronic message via [facility] portal (only   for patients who have registered [facility] account)  Preferred Call Back Phone Number? [phone]  ---------------------------------------------------------------------------  --------------  SCRIPT ANSWERS  Relationship to Patient? Self  Appointment reason? Symptomatic  Select script based on patient symptoms? Adult Cough/Cold Symptoms [Runny   Nose, Sore Throat, Flu, Sinus, Sinus Infection, Upper Respiratory   Infection [URI], Congestion]   Are you currently unable to finish sentences due to any difficulty   breathing? No  Are you unable to swallow liquids? No  Are you having fevers (100.4 or greater), chills, or sweats? Yes   Have you been diagnosed with, tested for, or told that you are suspected   of having COVID-19 (Coronavirus)? Yes  Did your symptoms begin or have you been tested for COVID-19 in the last   10 days? No  Have you had a fever or taken medication to treat a fever within the past   3 days? Yes    Pt scheduled for a VV with . Please advise.

## 2021-07-12 ENCOUNTER — OFFICE VISIT (OUTPATIENT)
Dept: SURGERY | Age: 66
End: 2021-07-12
Payer: MEDICARE

## 2021-07-12 VITALS
SYSTOLIC BLOOD PRESSURE: 143 MMHG | BODY MASS INDEX: 27.49 KG/M2 | DIASTOLIC BLOOD PRESSURE: 83 MMHG | HEART RATE: 94 BPM | OXYGEN SATURATION: 98 % | WEIGHT: 161 LBS | HEIGHT: 64 IN

## 2021-07-12 DIAGNOSIS — Z09 POSTOP CHECK: Primary | ICD-10-CM

## 2021-07-12 PROCEDURE — 4040F PNEUMOC VAC/ADMIN/RCVD: CPT | Performed by: SURGERY

## 2021-07-12 PROCEDURE — 3017F COLORECTAL CA SCREEN DOC REV: CPT | Performed by: SURGERY

## 2021-07-12 PROCEDURE — 1036F TOBACCO NON-USER: CPT | Performed by: SURGERY

## 2021-07-12 PROCEDURE — G8427 DOCREV CUR MEDS BY ELIG CLIN: HCPCS | Performed by: SURGERY

## 2021-07-12 PROCEDURE — 99213 OFFICE O/P EST LOW 20 MIN: CPT | Performed by: SURGERY

## 2021-07-12 PROCEDURE — G8417 CALC BMI ABV UP PARAM F/U: HCPCS | Performed by: SURGERY

## 2021-07-12 PROCEDURE — 1123F ACP DISCUSS/DSCN MKR DOCD: CPT | Performed by: SURGERY

## 2021-07-12 NOTE — PROGRESS NOTES
[facility]    CC: Facial lesions    Referring Physician: Bridgett Rojas MD    HPI: This is an 76 y.o.male with a PMHx as delineated below who presents to clinic in consultation for a right lower lid lesion. The patient noticed the lesion for approximately 1 year, but notes that it has grown fairly quickly in that time frame. He notes no pain that has worsened. There has not been any improvement with time. He notes that it does not affect his vision. He was seen by dermatology who recommended referral for excision and thus he was referred by Dr. Alyse Kemp to plastic surgery for evaluation and treatment. Of note, the patient has a fairly extensive history of skin cancers including melanoma. Since his last evaluation, he notes that there has not been any changes to the lesion. However, he has developed a new lateral cheek lesion without any other symptoms. He presents back for scheduled re-evaluation of the lesion.      PMHx:   Past Medical History:   Diagnosis Date    BPH (benign prostatic hyperplasia)     CAD (coronary artery disease)     Chronic prostatitis 1992, 4/17    DDD (degenerative disc disease), lumbar     L3- S1    Elevated fasting glucose 6/16    GERD (gastroesophageal reflux disease)     Hemochromatosis 2001    Hypochloremia 6/16    Hypokalemia 6/16    Leukocytopenia 6/16    Leukopenia     Lumbar disc herniation     L5-S1    Lumbar stenosis     per MRI 7/09; L2-S1    LV dysfunction 2/16    EF=40-45%    Melanoma in situ of back ([address] 3/07    Metatarsal stress fracture 11/95    Osteoarthritis of left AC (acromioclavicular) joint 09/2020    Tear of MCL (medial collateral ligament) of knee 5/01    Unspecified essential hypertension    hereditary hemochromisis  CHF (EF 40-45%)    PSHx:   Past Surgical History:   Procedure Laterality Date    COLONOSCOPY  12/04    Dr. Brian Govea      6/2012    INGUINAL HERNIA REPAIR Refill    pravastatin (PRAVACHOL) 40 MG tablet TAKE 1 TABLET BY MOUTH EVERY DAY 90 tablet 1    losartan (COZAAR) 100 MG tablet TAKE 1 TABLET BY MOUTH EVERY DAY 90 tablet 1    amLODIPine (NORVASC) 10 MG tablet TAKE 1 TABLET BY MOUTH EVERY DAY 90 tablet 1    hydroCHLOROthiazide (HYDRODIURIL) 25 MG tablet TAKE 1 TABLET BY MOUTH EVERY DAY IN THE MORNING (Patient taking differently: 12.5 mg ) 90 tablet 1    Crisaborole (EUCRISA) 2 % OINT Apply bid prn rash. (Patient not taking: Reported on 5/7/2021) 60 g 2    mometasone (ELOCON) 0.1 % cream Apply topically twice daily. (Patient not taking: Reported on 5/7/2021) 45 g 0    nitroGLYCERIN (NITROSTAT) 0.4 MG SL tablet Place 1 tablet under the tongue every 5 minutes as needed for Chest pain 25 tablet 1    ibuprofen (ADVIL;MOTRIN) 200 MG tablet Take 200 mg by mouth every 6 hours as needed for Pain      aspirin 81 MG tablet Take 81 mg by mouth daily.  tamsulosin (FLOMAX) 0.4 MG capsule Take 0.4 mg by mouth daily.  Glucosamine-Chondroitin 500-400 MG CAPS Take 1 capsule by mouth daily. No current facility-administered medications for this visit. ROS   Constitutional: Negative for chills and fever. HENT: Negative for congestion, facial swelling, and voice change. Eyes: Negative for photophobia and visual disturbance. Respiratory: Negative for apnea, cough, chest tightness and shortness of breath. Cardiovascular: Negative for chest pain and palpitations. Gastrointestinal: Negative for dysphagia and early satiety. Genitourinary: Negative for difficulty urinating, dysuria, flank pain, frequency and hematuria. Musculoskeletal: Negative for new gait problem, joint swelling and myalgias. Skin: See HPI. Endocrine: negative for tremors, temperature intolerance or polydipsia. Allergic/Immunologic: Negative for new environmental or food allergies. Neurological: Negative for dizziness, seizures, speech difficulty, numbness.    Hematological: Negative for adenopathy. Psychiatric/Behavioral: Negative for agitation and confusion. EXAM    BP (!) 143/83   Pulse 94   Ht 5' 4\" (1.626 m)   Wt 161 lb (73 kg)   SpO2 98%   BMI 27.64 kg/m²     Constitutional: Patient is oriented to person, place, and time. Vital signs are normal. Patient  appears well-developed and well-nourished. Patient  is active and cooperative. Non-toxic appearance. No distress. Neck: Trachea normal and normal range of motion. Neck supple. No JVD present. No tracheal tenderness present. Carotid bruit is not present. No rigidity. No tracheal deviation and no edema present. No thyromegaly present. Cardiovascular: Normal rate, regular rhythm, normal heart sounds, intact distal pulses and normal pulses. Pulmonary/Chest: Effort normal and breath sounds normal. No stridor. No respiratory distress. Patient  has no wheezes. Patient has no rales. Patient exhibits no tenderness and no crepitus. Abdominal: Soft. Normal appearance and bowel sounds are normal. Patient exhibits no distension, no abdominal bruit, no ascites and no mass. There is no hepatosplenomegaly. There is no tenderness. There is no rigidity, no rebound, no guarding and no CVA tenderness. Musculoskeletal: Normal range of motion. Patient exhibits no edema or tenderness. Neurological: Patient is alert and oriented to person, place, and time. Patient has normal strength. Coordination and gait normal. GCS eye subscore is 4. GCS verbal subscore is 5. GCS motor subscore is 6. Skin: Skin is warm and dry. No abrasion and no rash noted. Patient  is not diaphoretic. No cyanosis or erythema. 0.8 x 0.8 cm right lower lid lesion just lateral to the lateral limbus  Feels adherent to the skin  New right lateral lesion approximately 1 x 0.8 cm in line, but superior to the tragus  Psychiatric: Patient has a normal mood and affect.   speech is normal and behavior is normal. Cognition and memory are normal.     PATHOLOGY/WORKUP: None    IMP: 65 y.o.male with right lower lid lesion. PLAN: Would benefit from excision of his right lower lid lesion with likely full thickness skin graft / primary closure. If he were to develop an ectropion, than would plan for cervicofacial advancement flap. The lesion has not changed in size and he does not want surgical intervention at this time. Thus, will have him return in 1 year if desires for evaluation. He understands that if there are any changes, he will return earlier for evaluation. The patient was counseled at length about the risks of anjelica Covid-19 during their perioperative period and any recovery window from their procedure. The patient was made aware that anjelica Covid-19  may worsen their prognosis for recovering from their procedure  and lend to a higher morbidity and/or mortality risk. All material risks, benefits, and reasonable alternatives including postponing the procedure were discussed. The patient does wish to proceed with the procedure at this time.     Kirstie Ramos MD  [address] [place] [address] Reconstructive Surgery  [phone]  07/12/21

## 2021-07-15 RX ORDER — AMLODIPINE BESYLATE 10 MG/1
TABLET ORAL
Qty: 90 TABLET | Refills: 1 | Status: SHIPPED | OUTPATIENT
Start: 2021-07-15 | End: 2022-01-10

## 2021-08-28 ASSESSMENT — LIFESTYLE VARIABLES
HOW OFTEN DURING THE LAST YEAR HAVE YOU FAILED TO DO WHAT WAS NORMALLY EXPECTED FROM YOU BECAUSE OF DRINKING: NEVER
HAS A RELATIVE, FRIEND, DOCTOR, OR ANOTHER HEALTH PROFESSIONAL EXPRESSED CONCERN ABOUT YOUR DRINKING OR SUGGESTED YOU CUT DOWN: 0
HOW OFTEN DURING THE LAST YEAR HAVE YOU NEEDED AN ALCOHOLIC DRINK FIRST THING IN THE MORNING TO GET YOURSELF GOING AFTER A NIGHT OF HEAVY DRINKING: NEVER
HAS A RELATIVE, FRIEND, DOCTOR, OR ANOTHER HEALTH PROFESSIONAL EXPRESSED CONCERN ABOUT YOUR DRINKING OR SUGGESTED YOU CUT DOWN: NO
HOW MANY STANDARD DRINKS CONTAINING ALCOHOL DO YOU HAVE ON A TYPICAL DAY: ONE OR TWO
HOW OFTEN DURING THE LAST YEAR HAVE YOU NEEDED AN ALCOHOLIC DRINK FIRST THING IN THE MORNING TO GET YOURSELF GOING AFTER A NIGHT OF HEAVY DRINKING: 0
HOW OFTEN DO YOU HAVE SIX OR MORE DRINKS ON ONE OCCASION: NEVER
HAVE YOU OR SOMEONE ELSE BEEN INJURED AS A RESULT OF YOUR DRINKING: NO
HOW OFTEN DO YOU HAVE A DRINK CONTAINING ALCOHOL: TWO TO THREE TIMES A WEEK
HOW MANY STANDARD DRINKS CONTAINING ALCOHOL DO YOU HAVE ON A TYPICAL DAY: 0
HOW OFTEN DURING THE LAST YEAR HAVE YOU BEEN UNABLE TO REMEMBER WHAT HAPPENED THE NIGHT BEFORE BECAUSE YOU HAD BEEN DRINKING: 0
AUDIT-C TOTAL SCORE: 0
HOW OFTEN DURING THE LAST YEAR HAVE YOU HAD A FEELING OF GUILT OR REMORSE AFTER DRINKING: NEVER
HOW OFTEN DO YOU HAVE A DRINK CONTAINING ALCOHOL: 3
AUDIT TOTAL SCORE: 3
AUDIT TOTAL SCORE: 0
HOW OFTEN DURING THE LAST YEAR HAVE YOU FAILED TO DO WHAT WAS NORMALLY EXPECTED FROM YOU BECAUSE OF DRINKING: 0
HOW OFTEN DURING THE LAST YEAR HAVE YOU FOUND THAT YOU WERE NOT ABLE TO STOP DRINKING ONCE YOU HAD STARTED: NEVER
HOW OFTEN DURING THE LAST YEAR HAVE YOU FOUND THAT YOU WERE NOT ABLE TO STOP DRINKING ONCE YOU HAD STARTED: 0
AUDIT-C TOTAL SCORE: 3
HOW OFTEN DURING THE LAST YEAR HAVE YOU BEEN UNABLE TO REMEMBER WHAT HAPPENED THE NIGHT BEFORE BECAUSE YOU HAD BEEN DRINKING: NEVER
HAVE YOU OR SOMEONE ELSE BEEN INJURED AS A RESULT OF YOUR DRINKING: 0
HOW OFTEN DURING THE LAST YEAR HAVE YOU HAD A FEELING OF GUILT OR REMORSE AFTER DRINKING: 0
HOW OFTEN DO YOU HAVE SIX OR MORE DRINKS ON ONE OCCASION: 0

## 2021-08-28 ASSESSMENT — PATIENT HEALTH QUESTIONNAIRE - PHQ9
1. LITTLE INTEREST OR PLEASURE IN DOING THINGS: 0
SUM OF ALL RESPONSES TO PHQ QUESTIONS 1-9: 0
SUM OF ALL RESPONSES TO PHQ QUESTIONS 1-9: 0
SUM OF ALL RESPONSES TO PHQ9 QUESTIONS 1 & 2: 0
SUM OF ALL RESPONSES TO PHQ QUESTIONS 1-9: 0
2. FEELING DOWN, DEPRESSED OR HOPELESS: 0

## 2021-08-30 ENCOUNTER — OFFICE VISIT (OUTPATIENT)
Dept: FAMILY MEDICINE CLINIC | Age: 66
End: 2021-08-30

## 2021-08-30 DIAGNOSIS — E83.110 HEREDITARY HEMOCHROMATOSIS (HCC): ICD-10-CM

## 2021-08-30 DIAGNOSIS — I10 ESSENTIAL HYPERTENSION: Primary | ICD-10-CM

## 2021-08-30 SDOH — ECONOMIC STABILITY: FOOD INSECURITY: WITHIN THE PAST 12 MONTHS, YOU WORRIED THAT YOUR FOOD WOULD RUN OUT BEFORE YOU GOT MONEY TO BUY MORE.: NEVER TRUE

## 2021-08-30 SDOH — ECONOMIC STABILITY: FOOD INSECURITY: WITHIN THE PAST 12 MONTHS, THE FOOD YOU BOUGHT JUST DIDN'T LAST AND YOU DIDN'T HAVE MONEY TO GET MORE.: NEVER TRUE

## 2021-08-30 ASSESSMENT — SOCIAL DETERMINANTS OF HEALTH (SDOH): HOW HARD IS IT FOR YOU TO PAY FOR THE VERY BASICS LIKE FOOD, HOUSING, MEDICAL CARE, AND HEATING?: NOT HARD AT ALL

## 2021-09-13 ASSESSMENT — LIFESTYLE VARIABLES
HOW MANY STANDARD DRINKS CONTAINING ALCOHOL DO YOU HAVE ON A TYPICAL DAY: ONE OR TWO
HOW OFTEN DO YOU HAVE SIX OR MORE DRINKS ON ONE OCCASION: NEVER
HOW OFTEN DO YOU HAVE SIX OR MORE DRINKS ON ONE OCCASION: 0
HOW OFTEN DO YOU HAVE A DRINK CONTAINING ALCOHOL: FOUR OR MORE TIMES A WEEK
HOW OFTEN DURING THE LAST YEAR HAVE YOU FOUND THAT YOU WERE NOT ABLE TO STOP DRINKING ONCE YOU HAD STARTED: 0
HOW OFTEN DO YOU HAVE A DRINK CONTAINING ALCOHOL: 4
AUDIT-C TOTAL SCORE: 4
HOW MANY STANDARD DRINKS CONTAINING ALCOHOL DO YOU HAVE ON A TYPICAL DAY: 0
HOW OFTEN DURING THE LAST YEAR HAVE YOU FOUND THAT YOU WERE NOT ABLE TO STOP DRINKING ONCE YOU HAD STARTED: NEVER
HOW OFTEN DURING THE LAST YEAR HAVE YOU NEEDED AN ALCOHOLIC DRINK FIRST THING IN THE MORNING TO GET YOURSELF GOING AFTER A NIGHT OF HEAVY DRINKING: NEVER
HOW OFTEN DURING THE LAST YEAR HAVE YOU FAILED TO DO WHAT WAS NORMALLY EXPECTED FROM YOU BECAUSE OF DRINKING: 0
AUDIT-C TOTAL SCORE: 0
HOW OFTEN DURING THE LAST YEAR HAVE YOU HAD A FEELING OF GUILT OR REMORSE AFTER DRINKING: NEVER
HOW OFTEN DURING THE LAST YEAR HAVE YOU HAD A FEELING OF GUILT OR REMORSE AFTER DRINKING: 0
HAVE YOU OR SOMEONE ELSE BEEN INJURED AS A RESULT OF YOUR DRINKING: 0
HOW OFTEN DURING THE LAST YEAR HAVE YOU BEEN UNABLE TO REMEMBER WHAT HAPPENED THE NIGHT BEFORE BECAUSE YOU HAD BEEN DRINKING: 0
AUDIT TOTAL SCORE: 4
HAS A RELATIVE, FRIEND, DOCTOR, OR ANOTHER HEALTH PROFESSIONAL EXPRESSED CONCERN ABOUT YOUR DRINKING OR SUGGESTED YOU CUT DOWN: 0
HOW OFTEN DURING THE LAST YEAR HAVE YOU FAILED TO DO WHAT WAS NORMALLY EXPECTED FROM YOU BECAUSE OF DRINKING: NEVER
HAVE YOU OR SOMEONE ELSE BEEN INJURED AS A RESULT OF YOUR DRINKING: NO
AUDIT TOTAL SCORE: 0
HAS A RELATIVE, FRIEND, DOCTOR, OR ANOTHER HEALTH PROFESSIONAL EXPRESSED CONCERN ABOUT YOUR DRINKING OR SUGGESTED YOU CUT DOWN: NO
HOW OFTEN DURING THE LAST YEAR HAVE YOU BEEN UNABLE TO REMEMBER WHAT HAPPENED THE NIGHT BEFORE BECAUSE YOU HAD BEEN DRINKING: NEVER
HOW OFTEN DURING THE LAST YEAR HAVE YOU NEEDED AN ALCOHOLIC DRINK FIRST THING IN THE MORNING TO GET YOURSELF GOING AFTER A NIGHT OF HEAVY DRINKING: 0

## 2021-09-13 ASSESSMENT — PATIENT HEALTH QUESTIONNAIRE - PHQ9
2. FEELING DOWN, DEPRESSED OR HOPELESS: 0
SUM OF ALL RESPONSES TO PHQ QUESTIONS 1-9: 0
SUM OF ALL RESPONSES TO PHQ QUESTIONS 1-9: 0
SUM OF ALL RESPONSES TO PHQ9 QUESTIONS 1 & 2: 0
SUM OF ALL RESPONSES TO PHQ QUESTIONS 1-9: 0
1. LITTLE INTEREST OR PLEASURE IN DOING THINGS: 0

## 2021-09-20 ENCOUNTER — OFFICE VISIT (OUTPATIENT)
Dept: FAMILY MEDICINE CLINIC | Age: 66
End: 2021-09-20
Payer: MEDICARE

## 2021-09-20 VITALS
DIASTOLIC BLOOD PRESSURE: 70 MMHG | HEIGHT: 63 IN | BODY MASS INDEX: 28.03 KG/M2 | OXYGEN SATURATION: 100 % | RESPIRATION RATE: 16 BRPM | SYSTOLIC BLOOD PRESSURE: 132 MMHG | TEMPERATURE: 96.8 F | HEART RATE: 71 BPM | WEIGHT: 158.2 LBS

## 2021-09-20 DIAGNOSIS — C44.519 BASAL CELL CARCINOMA (BCC) OF CHEST: ICD-10-CM

## 2021-09-20 DIAGNOSIS — E83.110 HEREDITARY HEMOCHROMATOSIS (HCC): ICD-10-CM

## 2021-09-20 DIAGNOSIS — Z23 NEEDS FLU SHOT: ICD-10-CM

## 2021-09-20 DIAGNOSIS — D72.819 LEUKOPENIA, UNSPECIFIED TYPE: ICD-10-CM

## 2021-09-20 DIAGNOSIS — M51.36 DDD (DEGENERATIVE DISC DISEASE), LUMBAR: ICD-10-CM

## 2021-09-20 DIAGNOSIS — Z00.00 ROUTINE GENERAL MEDICAL EXAMINATION AT A HEALTH CARE FACILITY: Primary | ICD-10-CM

## 2021-09-20 DIAGNOSIS — I25.10 CORONARY ARTERY DISEASE INVOLVING NATIVE CORONARY ARTERY OF NATIVE HEART, UNSPECIFIED WHETHER ANGINA PRESENT: ICD-10-CM

## 2021-09-20 DIAGNOSIS — R73.01 ELEVATED FASTING GLUCOSE: ICD-10-CM

## 2021-09-20 DIAGNOSIS — I10 ESSENTIAL HYPERTENSION: ICD-10-CM

## 2021-09-20 DIAGNOSIS — Z23 NEED FOR VACCINATION FOR STREP PNEUMONIAE: ICD-10-CM

## 2021-09-20 PROCEDURE — G0009 ADMIN PNEUMOCOCCAL VACCINE: HCPCS | Performed by: FAMILY MEDICINE

## 2021-09-20 PROCEDURE — 3017F COLORECTAL CA SCREEN DOC REV: CPT | Performed by: FAMILY MEDICINE

## 2021-09-20 PROCEDURE — 90732 PPSV23 VACC 2 YRS+ SUBQ/IM: CPT | Performed by: FAMILY MEDICINE

## 2021-09-20 PROCEDURE — G0439 PPPS, SUBSEQ VISIT: HCPCS | Performed by: FAMILY MEDICINE

## 2021-09-20 PROCEDURE — G0008 ADMIN INFLUENZA VIRUS VAC: HCPCS | Performed by: FAMILY MEDICINE

## 2021-09-20 PROCEDURE — 4040F PNEUMOC VAC/ADMIN/RCVD: CPT | Performed by: FAMILY MEDICINE

## 2021-09-20 PROCEDURE — 90694 VACC AIIV4 NO PRSRV 0.5ML IM: CPT | Performed by: FAMILY MEDICINE

## 2021-09-20 PROCEDURE — 1123F ACP DISCUSS/DSCN MKR DOCD: CPT | Performed by: FAMILY MEDICINE

## 2021-09-20 NOTE — PATIENT INSTRUCTIONS
and sign in to your BrightLine account. Enter S737 in the Haitaobei box to learn more about \"Back Stretches: Exercises. \"     If you do not have an account, please click on the \"Sign Up Now\" link. Current as of: November 16, 2020               Content Version: 12.9  © 2006-2021 Healthwise, Incorporated. Care instructions adapted under license by [facility] ([facility]). If you have questions about a medical condition or this instruction, always ask your healthcare professional. [place] [address] any warranty or liability for your use of this information. Patient Education        Back Stretches: Exercises  Introduction  Here are some examples of exercises for stretching your back. Start each exercise slowly. Ease off the exercise if you start to have pain. Your doctor or physical therapist will tell you when you can start these exercises and which ones will work best for you. How to do the exercises  Overhead stretch   5. Stand comfortably with your feet shoulder-width apart. 6. Looking straight ahead, raise both arms over your head and reach toward the ceiling. Do not allow your head to tilt back. 7. Hold for 15 to 30 seconds, then lower your arms to your sides. 8. Repeat 2 to 4 times. Side stretch   5. Stand comfortably with your feet shoulder-width apart. 6. Raise one arm over your head, and then lean to the other side. 7. Slide your hand down your leg as you let the weight of your arm gently stretch your side muscles. Hold for 15 to 30 seconds. 8. Repeat 2 to 4 times on each side. Press-up   5. Lie on your stomach, supporting your body with your forearms. 6. Press your elbows down into the floor to raise your upper back. As you do this, relax your stomach muscles and allow your back to arch without using your back muscles. As your press up, do not let your hips or pelvis come off the floor. 7. Hold for 15 to 30 seconds, then relax. 8. Repeat 2 to 4 times.     Relax and rest   5. Lie on your back with a rolled towel under your neck and a pillow under your knees. Extend your arms comfortably to your sides. 6. Relax and breathe normally. 7. Remain in this position for about 10 minutes. 8. If you can, do this 2 or 3 times each day. Follow-up care is a key part of your treatment and safety. Be sure to make and go to all appointments, and call your doctor if you are having problems. It's also a good idea to know your test results and keep a list of the medicines you take. Where can you learn more? Go to [email or web address]://[email or web address].[email or web address] org and sign in to your zumatek account. Enter G687 in the Mzinga box to learn more about \"Back Stretches: Exercises. \"     If you do not have an account, please click on the \"Sign Up Now\" link. Current as of: November 16, 2020               Content Version: 12.9  © 2006-2021 TRAN.SL. Care instructions adapted under license by [facility] ([facility]). If you have questions about a medical condition or this instruction, always ask your healthcare professional. [place] [address] any warranty or liability for your use of this information. Patient Education        Low Back Arthritis: Exercises  Introduction  Here are some examples of typical rehabilitation exercises for your condition. Start each exercise slowly. Ease off the exercise if you start to have pain. Your doctor or physical therapist will tell you when you can start these exercises and which ones will work best for you. When you are not being active, find a comfortable position for rest. Some people are comfortable on the floor or a medium-firm bed with a small pillow under their head and another under their knees. Some people prefer to lie on their side with a pillow between their knees. Don't stay in one position for too long. Take short walks (10 to 20 minutes) every 2 to 3 hours.  Avoid slopes, hills, and stairs until you feel better. Walk only distances you can manage without pain, especially leg pain. How to do the exercises  Pelvic tilt   1. Lie on your back with your knees bent. 2. \"Brace\" your stomachtighten your muscles by pulling in and imagining your belly button moving toward your spine. 3. Press your lower back into the floor. You should feel your hips and pelvis rock back. 4. Hold for 6 seconds while breathing smoothly. 5. Relax and allow your pelvis and hips to rock forward. 6. Repeat 8 to 12 times. Back stretches   1. Get down on your hands and knees on the floor. 2. Relax your head and allow it to droop. Round your back up toward the ceiling until you feel a nice stretch in your upper, middle, and lower back. Hold this stretch for as long as it feels comfortable, or about 15 to 30 seconds. 3. Return to the starting position with a flat back while you are on your hands and knees. 4. Let your back sway by pressing your stomach toward the floor. Lift your buttocks toward the ceiling. 5. Hold this position for 15 to 30 seconds. 6. Repeat 2 to 4 times. Follow-up care is a key part of your treatment and safety. Be sure to make and go to all appointments, and call your doctor if you are having problems. It's also a good idea to know your test results and keep a list of the medicines you take. Where can you learn more? Go to [email or web address] org and sign in to your IdleAir account. Enter M255 in the [facility] box to learn more about \"Low Back Arthritis: Exercises. \"     If you do not have an account, please click on the \"Sign Up Now\" link. Current as of: November 16, 2020               Content Version: 12.9  © 1020-7207 Healthwise, [employer]. Care instructions adapted under license by [facility] ([facility]).  If you have questions about a medical condition or this instruction, always ask your healthcare professional. L[place] Freitas any warranty or liability for

## 2021-09-20 NOTE — PROGRESS NOTES
Medicare Annual Wellness Visit  Name: Henny Mendes Date: 2021   MRN: <[id number]> Sex: Male   Age: 76 y.o. Ethnicity: Declined   : 1955 Race: White (non-)      Fabiola Fernandez is here for Medicare AWV    Screenings for behavioral, psychosocial and functional/safety risks, and cognitive dysfunction are all negative except as indicated below. These results, as well as other patient data from the [address] [facility] [address] form, are documented in Flowsheets linked to this Encounter. Some right mid back discomfort X 3 weeks. Not taking anything for it.  0-3/10 . Sleeping okay . Unsure what aggravates or relieves the pain. He was golfing 1 month ago with [employer] . After golfing he had a beer and took a shot after it. No heartburn/ indigestion. He does not recall a precipitating event. It is improving and better than last week. No change with Ibuprofen. Eating does not flare it either. He gets phlebotomy about q 3 months. Allergies   Allergen Reactions    Hctz [Hydrochlorothiazide]      Seizure due to low sodium    Lisinopril Other (See Comments)     Cough         Prior to Visit Medications    Medication Sig Taking? Authorizing Provider   amLODIPine (NORVASC) 10 MG tablet TAKE 1 TABLET BY MOUTH EVERY DAY Yes Aloysius Kayser, MD   pravastatin (PRAVACHOL) 40 MG tablet TAKE 1 TABLET BY MOUTH EVERY DAY Yes Aloysius Kayser, MD   losartan (COZAAR) 100 MG tablet TAKE 1 TABLET BY MOUTH EVERY DAY Yes Aloysius Kayser, MD   nitroGLYCERIN (NITROSTAT) 0.4 MG SL tablet Place 1 tablet under the tongue every 5 minutes as needed for Chest pain Yes Lauryn Hull MD   ibuprofen (ADVIL;MOTRIN) 200 MG tablet Take 200 mg by mouth every 6 hours as needed for Pain Yes Historical Provider, MD   aspirin 81 MG tablet Take 81 mg by mouth daily. Yes Historical Provider, MD   tamsulosin (FLOMAX) 0.4 MG capsule Take 0.4 mg by mouth daily.    Yes Historical Provider, MD   hydroCHLOROthiazide (HYDRODIURIL) 25 MG tablet (Hematology and Oncology)  Estiven Mccabe MD as PCP - [facility] Provider    Wt Readings from Last 3 Encounters:   09/20/21 158 lb 3.2 oz (71.8 kg)   07/12/21 161 lb (73 kg)   01/04/21 162 lb (73.5 kg)     Vitals:    09/20/21 1043   BP: 132/70   Pulse: 71   Resp: 16   Temp: 96.8 °F (36 °C)   SpO2: 100%   Weight: 158 lb 3.2 oz (71.8 kg)   Height: 5' 2.8\" (1.595 m)     Body mass index is 28.21 kg/m². Based upon direct observation of the patient, evaluation of cognition reveals recent and remote memory intact. General Appearance: alert and oriented to person, place and time, well developed and well- nourished, in no acute distress  Skin: warm and dry, no rash or erythema  Head: normocephalic and atraumatic  Eyes: pupils equal, round, and reactive to light, extraocular eye movements intact, conjunctivae normal  ENT: tympanic membrane, external ear and ear canal normal bilaterally, nose without deformity, nasal mucosa and turbinates normal without polyps  Neck: supple and non-tender without mass, no thyromegaly or thyroid nodules, no cervical lymphadenopathy  Pulmonary/Chest: clear to auscultation bilaterally- no wheezes, rales or rhonchi, normal air movement, no respiratory distress  Cardiovascular: normal rate, regular rhythm, normal S1 and S2, no murmurs, rubs, clicks, or gallops, distal pulses intact, no carotid bruits  Abdomen: soft, non-tender, non-distended, normal bowel sounds, no masses or organomegaly  Extremities: no cyanosis, clubbing or edema  Musculoskeletal: normal range of motion, no joint swelling, deformity or tenderness  Neurologic: reflexes normal and symmetric, no cranial nerve deficit, gait, coordination and speech normal    Patient's complete Health Risk Assessment and screening values have been reviewed and are found in Flowsheets. The following problems were reviewed today and where indicated follow up appointments were made and/or referrals ordered.     Positive Risk Factor Screenings with Interventions:               No Positive Risk Factors identified today. Personalized Preventive Plan   Current Health Maintenance Status  Immunization History   Administered Date(s) Administered    COVID-19, Moderna, PF, 100mcg/0.5mL 02/09/2021, 03/09/2021    Influenza Virus Vaccine 08/27/2014, 09/16/2016, 10/01/2018    Influenza, High Dose (Fluzone 65 yrs and older) 10/10/2019    Influenza, High-dose, Quadv, 65 yrs +, IM (Fluzone) 09/17/2020    Pneumococcal Conjugate 13-valent (Pybdmmc12) 10/10/2019    Td, unspecified formulation 03/12/2018    Tdap (Boostrix, Adacel) 10/21/2007    Zoster Live (Zostavax) 05/25/2016        Health Maintenance   Topic Date Due    Shingles Vaccine (2 of 3) 07/20/2016    Annual Wellness Visit (AWV)  Never done    Pneumococcal 65+ years Vaccine (2 of 2 - PPSV23) 10/10/2020    Flu vaccine (1) 09/01/2021    A1C test (Diabetic or Prediabetic)  09/22/2021    Lipid screen  09/22/2021    Potassium monitoring  08/06/2022    Creatinine monitoring  08/06/2022    Colon cancer screen colonoscopy  07/29/2025    DTaP/Tdap/Td vaccine (3 - Td or Tdap) 03/12/2028    COVID-19 Vaccine  Completed    Hepatitis C screen  Completed    Hepatitis A vaccine  Aged Out    Hepatitis B vaccine  Aged Out    Hib vaccine  Aged Out    Meningococcal (ACWY) vaccine  Aged Out     Recommendations for [employer] Due: see orders and patient instructions/AVS.  . Recommended screening schedule for the next 5-10 years is provided to the patient in written form: see Patient Funmi Hernandez was seen today for medicare awv. Diagnoses and all orders for this visit:    1. Routine general medical examination at a health care facility  - CBC  - complete metabolic panel   - Lipid    2. Essential hypertension  - Controlled. Continue Amlodipine 10 mg qd and low sodium diet. - Comprehensive Metabolic Panel; Future    3. Hereditary hemochromatosis ([facility] [address]  - CBC;  Future  - Goes for Phlebotomy q 3 months. 4. Coronary artery disease involving native coronary artery of native heart, unspecified whether angina present  - Stable. Continue Aspirin 81 mg qd and Pravastatin 40 mg qd. - Comprehensive Metabolic Panel; Future  - Lipid Panel; Future    5. DDD (degenerative disc disease), lumbar  - Stable. Moist heat . ROM exercises. Modify activities. 6. Elevated fasting glucose  - Comprehensive Metabolic Panel; Future  - Hemoglobin A1C; Future    7. Leukopenia, unspecified type  - CBC; Future    8. Basal cell carcinoma (BCC) of chest  - Recommended regular sunscreen usage- SPF > 30 .    9. Needs flu shot  - INFLUENZA, QUADV, ADJUVANTED, 65 YRS =, IM, PF, PREFILL SYR, 0.5ML (FLUAD)    10. Need for vaccination for Strep pneumoniae  - Pneumococcal polysaccharide vaccine 23-valent greater than or equal to 3yo subcutaneous/IM    Follow up 6 months/ prn.

## 2021-10-10 DIAGNOSIS — I10 ESSENTIAL HYPERTENSION: ICD-10-CM

## 2021-10-11 RX ORDER — LOSARTAN POTASSIUM 100 MG/1
TABLET ORAL
Qty: 90 TABLET | Refills: 1 | Status: SHIPPED | OUTPATIENT
Start: 2021-10-11 | End: 2022-04-05

## 2021-10-11 RX ORDER — PRAVASTATIN SODIUM 40 MG
TABLET ORAL
Qty: 90 TABLET | Refills: 1 | Status: SHIPPED | OUTPATIENT
Start: 2021-10-11 | End: 2022-04-05

## 2021-10-11 NOTE — TELEPHONE ENCOUNTER
Requested Prescriptions     Pending Prescriptions Disp Refills    pravastatin (PRAVACHOL) 40 MG tablet [Pharmacy Med Name: PRAVASTATIN SODIUM 40 MG TAB] 90 tablet 1     Sig: TAKE 1 TABLET BY MOUTH EVERY DAY    losartan (COZAAR) 100 MG tablet [Pharmacy Med Name: LOSARTAN POTASSIUM 100 MG TAB] 90 tablet 1     Sig: TAKE 1 TABLET BY MOUTH EVERY DAY     Last ov 9/20/21  Last lab 9/21/21

## 2022-01-10 RX ORDER — AMLODIPINE BESYLATE 10 MG/1
TABLET ORAL
Qty: 90 TABLET | Refills: 1 | Status: SHIPPED | OUTPATIENT
Start: 2022-01-10 | End: 2022-07-10

## 2022-01-10 NOTE — TELEPHONE ENCOUNTER
Requested Prescriptions     Pending Prescriptions Disp Refills    amLODIPine (NORVASC) 10 MG tablet [Pharmacy Med Name: AMLODIPINE BESYLATE 10 MG TAB] 90 tablet 1     Sig: TAKE 1 TABLET BY MOUTH EVERY DAY     Last ov 9/20/2021  Last labs 09/21/2021

## 2022-04-05 DIAGNOSIS — I10 ESSENTIAL HYPERTENSION: ICD-10-CM

## 2022-04-05 RX ORDER — LOSARTAN POTASSIUM 100 MG/1
100 TABLET ORAL DAILY
Qty: 90 TABLET | Refills: 0 | Status: SHIPPED | OUTPATIENT
Start: 2022-04-05 | End: 2022-07-05

## 2022-04-05 RX ORDER — PRAVASTATIN SODIUM 40 MG
40 TABLET ORAL DAILY
Qty: 90 TABLET | Refills: 0 | Status: SHIPPED | OUTPATIENT
Start: 2022-04-05 | End: 2022-07-05

## 2022-04-05 NOTE — TELEPHONE ENCOUNTER
Requested Prescriptions     Pending Prescriptions Disp Refills    losartan (COZAAR) 100 MG tablet [Pharmacy Med Name: LOSARTAN POTASSIUM 100 MG TAB] 90 tablet 1     Sig: TAKE 1 TABLET BY MOUTH EVERY DAY    pravastatin (PRAVACHOL) 40 MG tablet [Pharmacy Med Name: PRAVASTATIN SODIUM 40 MG TAB] 90 tablet 1     Sig: TAKE 1 TABLET BY MOUTH EVERY DAY     Last OV- 9/20/2021  Labs- 9/21/21  NFOV

## 2022-07-03 DIAGNOSIS — I10 ESSENTIAL HYPERTENSION: ICD-10-CM

## 2022-07-05 RX ORDER — PRAVASTATIN SODIUM 40 MG
40 TABLET ORAL DAILY
Qty: 90 TABLET | Refills: 0 | Status: SHIPPED | OUTPATIENT
Start: 2022-07-05 | End: 2022-10-05

## 2022-07-05 RX ORDER — LOSARTAN POTASSIUM 100 MG/1
100 TABLET ORAL DAILY
Qty: 90 TABLET | Refills: 0 | Status: SHIPPED | OUTPATIENT
Start: 2022-07-05 | End: 2022-10-05

## 2022-07-10 RX ORDER — AMLODIPINE BESYLATE 10 MG/1
TABLET ORAL
Qty: 90 TABLET | Refills: 0 | Status: SHIPPED | OUTPATIENT
Start: 2022-07-10 | End: 2022-10-05

## 2022-10-05 DIAGNOSIS — I10 ESSENTIAL HYPERTENSION: ICD-10-CM

## 2022-10-05 RX ORDER — PRAVASTATIN SODIUM 40 MG
40 TABLET ORAL DAILY
Qty: 90 TABLET | Refills: 0 | Status: SHIPPED | OUTPATIENT
Start: 2022-10-05

## 2022-10-05 RX ORDER — AMLODIPINE BESYLATE 10 MG/1
TABLET ORAL
Qty: 90 TABLET | Refills: 0 | Status: SHIPPED | OUTPATIENT
Start: 2022-10-05

## 2022-10-05 RX ORDER — LOSARTAN POTASSIUM 100 MG/1
100 TABLET ORAL DAILY
Qty: 90 TABLET | Refills: 0 | Status: SHIPPED | OUTPATIENT
Start: 2022-10-05

## 2022-10-05 NOTE — TELEPHONE ENCOUNTER
Requested Prescriptions     Pending Prescriptions Disp Refills    pravastatin (PRAVACHOL) 40 MG tablet [Pharmacy Med Name: PRAVASTATIN SODIUM 40 MG TAB] 90 tablet 1     Sig: TAKE 1 TABLET BY MOUTH DAILY FOLLOW UP APPOINTMENT AND LABS ARE NEEDED.     losartan (COZAAR) 100 MG tablet [Pharmacy Med Name: LOSARTAN POTASSIUM 100 MG TAB] 90 tablet 1     Sig: TAKE 1 TABLET BY MOUTH DAILY FOLLOW UP APPOINTMENT AND LABS ARE NEEDED.    amLODIPine (NORVASC) 10 MG tablet [Pharmacy Med Name: AMLODIPINE BESYLATE 10 MG TAB] 90 tablet 1     Sig: TAKE 1 TABLET BY MOUTH EVERY DAY        Last ov  9/21/21 AWV  Last lab  9/21/21  Next ov 10/13/22 AWV

## 2022-10-13 ENCOUNTER — OFFICE VISIT (OUTPATIENT)
Dept: FAMILY MEDICINE CLINIC | Age: 67
End: 2022-10-13
Payer: MEDICARE

## 2022-10-13 VITALS
BODY MASS INDEX: 28.17 KG/M2 | HEIGHT: 63 IN | SYSTOLIC BLOOD PRESSURE: 138 MMHG | OXYGEN SATURATION: 98 % | DIASTOLIC BLOOD PRESSURE: 68 MMHG | HEART RATE: 88 BPM | WEIGHT: 159 LBS

## 2022-10-13 DIAGNOSIS — R20.0 ANTERIOR THIGH NUMBNESS: ICD-10-CM

## 2022-10-13 DIAGNOSIS — N40.0 BENIGN PROSTATIC HYPERPLASIA WITHOUT LOWER URINARY TRACT SYMPTOMS: ICD-10-CM

## 2022-10-13 DIAGNOSIS — C44.519 BASAL CELL CARCINOMA (BCC) OF CHEST: ICD-10-CM

## 2022-10-13 DIAGNOSIS — Z00.00 ROUTINE GENERAL MEDICAL EXAMINATION AT A HEALTH CARE FACILITY: Primary | ICD-10-CM

## 2022-10-13 DIAGNOSIS — M51.36 DDD (DEGENERATIVE DISC DISEASE), LUMBAR: ICD-10-CM

## 2022-10-13 DIAGNOSIS — R73.01 ELEVATED FASTING GLUCOSE: ICD-10-CM

## 2022-10-13 DIAGNOSIS — E83.110 HEREDITARY HEMOCHROMATOSIS (HCC): ICD-10-CM

## 2022-10-13 DIAGNOSIS — M19.012 OSTEOARTHRITIS OF LEFT AC (ACROMIOCLAVICULAR) JOINT: ICD-10-CM

## 2022-10-13 DIAGNOSIS — I25.10 CORONARY ARTERY DISEASE INVOLVING NATIVE CORONARY ARTERY OF NATIVE HEART, UNSPECIFIED WHETHER ANGINA PRESENT: ICD-10-CM

## 2022-10-13 DIAGNOSIS — I10 ESSENTIAL HYPERTENSION: ICD-10-CM

## 2022-10-13 DIAGNOSIS — H81.90 VESTIBULAR DIZZINESS: ICD-10-CM

## 2022-10-13 PROCEDURE — G0439 PPPS, SUBSEQ VISIT: HCPCS | Performed by: FAMILY MEDICINE

## 2022-10-13 PROCEDURE — G8484 FLU IMMUNIZE NO ADMIN: HCPCS | Performed by: FAMILY MEDICINE

## 2022-10-13 PROCEDURE — 3017F COLORECTAL CA SCREEN DOC REV: CPT | Performed by: FAMILY MEDICINE

## 2022-10-13 PROCEDURE — 1123F ACP DISCUSS/DSCN MKR DOCD: CPT | Performed by: FAMILY MEDICINE

## 2022-10-13 SDOH — ECONOMIC STABILITY: FOOD INSECURITY: WITHIN THE PAST 12 MONTHS, THE FOOD YOU BOUGHT JUST DIDN'T LAST AND YOU DIDN'T HAVE MONEY TO GET MORE.: NEVER TRUE

## 2022-10-13 SDOH — ECONOMIC STABILITY: FOOD INSECURITY: WITHIN THE PAST 12 MONTHS, YOU WORRIED THAT YOUR FOOD WOULD RUN OUT BEFORE YOU GOT MONEY TO BUY MORE.: NEVER TRUE

## 2022-10-13 SDOH — HEALTH STABILITY: PHYSICAL HEALTH: ON AVERAGE, HOW MANY DAYS PER WEEK DO YOU ENGAGE IN MODERATE TO STRENUOUS EXERCISE (LIKE A BRISK WALK)?: 6 DAYS

## 2022-10-13 SDOH — HEALTH STABILITY: PHYSICAL HEALTH: ON AVERAGE, HOW MANY MINUTES DO YOU ENGAGE IN EXERCISE AT THIS LEVEL?: 120 MIN

## 2022-10-13 ASSESSMENT — LIFESTYLE VARIABLES
HOW OFTEN DO YOU HAVE SIX OR MORE DRINKS ON ONE OCCASION: 2
HOW OFTEN DURING THE LAST YEAR HAVE YOU HAD A FEELING OF GUILT OR REMORSE AFTER DRINKING: LESS THAN MONTHLY
HOW OFTEN DO YOU HAVE A DRINK CONTAINING ALCOHOL: 4 OR MORE TIMES A WEEK
HAS A RELATIVE, FRIEND, DOCTOR, OR ANOTHER HEALTH PROFESSIONAL EXPRESSED CONCERN ABOUT YOUR DRINKING OR SUGGESTED YOU CUT DOWN: 0
HAVE YOU OR SOMEONE ELSE BEEN INJURED AS A RESULT OF YOUR DRINKING: 0
HOW OFTEN DURING THE LAST YEAR HAVE YOU FOUND THAT YOU WERE NOT ABLE TO STOP DRINKING ONCE YOU HAD STARTED: NEVER
HOW OFTEN DURING THE LAST YEAR HAVE YOU NEEDED AN ALCOHOLIC DRINK FIRST THING IN THE MORNING TO GET YOURSELF GOING AFTER A NIGHT OF HEAVY DRINKING: 0
HAVE YOU OR SOMEONE ELSE BEEN INJURED AS A RESULT OF YOUR DRINKING: NO
HOW MANY STANDARD DRINKS CONTAINING ALCOHOL DO YOU HAVE ON A TYPICAL DAY: 5
HAS A RELATIVE, FRIEND, DOCTOR, OR ANOTHER HEALTH PROFESSIONAL EXPRESSED CONCERN ABOUT YOUR DRINKING OR SUGGESTED YOU CUT DOWN: NO
HOW MANY STANDARD DRINKS CONTAINING ALCOHOL DO YOU HAVE ON A TYPICAL DAY: 10 OR MORE
HOW OFTEN DURING THE LAST YEAR HAVE YOU BEEN UNABLE TO REMEMBER WHAT HAPPENED THE NIGHT BEFORE BECAUSE YOU HAD BEEN DRINKING: NEVER
HOW OFTEN DURING THE LAST YEAR HAVE YOU NEEDED AN ALCOHOLIC DRINK FIRST THING IN THE MORNING TO GET YOURSELF GOING AFTER A NIGHT OF HEAVY DRINKING: NEVER
HOW OFTEN DURING THE LAST YEAR HAVE YOU BEEN UNABLE TO REMEMBER WHAT HAPPENED THE NIGHT BEFORE BECAUSE YOU HAD BEEN DRINKING: 0
HOW OFTEN DURING THE LAST YEAR HAVE YOU FAILED TO DO WHAT WAS NORMALLY EXPECTED FROM YOU BECAUSE OF DRINKING: NEVER
HOW OFTEN DO YOU HAVE A DRINK CONTAINING ALCOHOL: 5
HOW OFTEN DURING THE LAST YEAR HAVE YOU HAD A FEELING OF GUILT OR REMORSE AFTER DRINKING: 1
HOW OFTEN DURING THE LAST YEAR HAVE YOU FAILED TO DO WHAT WAS NORMALLY EXPECTED FROM YOU BECAUSE OF DRINKING: 0
HOW OFTEN DURING THE LAST YEAR HAVE YOU FOUND THAT YOU WERE NOT ABLE TO STOP DRINKING ONCE YOU HAD STARTED: 0

## 2022-10-13 ASSESSMENT — PATIENT HEALTH QUESTIONNAIRE - PHQ9
SUM OF ALL RESPONSES TO PHQ QUESTIONS 1-9: 0
2. FEELING DOWN, DEPRESSED OR HOPELESS: 0
1. LITTLE INTEREST OR PLEASURE IN DOING THINGS: 0
SUM OF ALL RESPONSES TO PHQ9 QUESTIONS 1 & 2: 0

## 2022-10-13 ASSESSMENT — SOCIAL DETERMINANTS OF HEALTH (SDOH): HOW HARD IS IT FOR YOU TO PAY FOR THE VERY BASICS LIKE FOOD, HOUSING, MEDICAL CARE, AND HEATING?: NOT HARD AT ALL

## 2022-10-13 NOTE — PROGRESS NOTES
head position changes forward/ flexion or hyperextension. Lasts < 30 seconds . Not daily unless a lot of head position changes. Some right anterior thigh numbness intermittent , not daily. Sleeping okay . He had COVID exposure on Saturday . No symptoms > 5 days from exposure. Patient's complete Health Risk Assessment and screening values have been reviewed and are found in Flowsheets. The following problems were reviewed today and where indicated follow up appointments were made and/or referrals ordered. Positive Risk Factor Screenings with Interventions:        Alcohol Screening:  Alcohol Use: Heavy Drinker    Frequency of Alcohol Consumption: 4 or more times a week    Average Number of Drinks: 10 or more    Frequency of Binge Drinking: Less than monthly     AUDIT-C Score: 9  AUDIT Total Score: 10  An AUDIT-C score of 3-7 indicates potential alcohol risk. An AUDIT Total score of 8 or more is associated with harmful or hazardous drinking. A score of 13 or more in women, and 15 or more in men, is likely to indicate alcohol dependence. Substance Use - Alcohol Interventions:  Recommended limiting alcohol consumption to 3 or less per 24 hours and < 14 per week ideally         Health Habits/Nutrition:  Physical Activity: Sufficiently Active    Days of Exercise per Week: 6 days    Minutes of Exercise per Session: 120 min     Have you lost any weight without trying in the past 3 months?: (!) Yes  Body mass index: (!) 28.34  Have you seen the dentist within the past year?: Yes  Health Habits/Nutrition Interventions:  Inadequate physical activity:  patient agrees to exercise for at least 150 minutes/week             Objective   Vitals:    10/13/22 1334 10/13/22 1414   BP: (!) 144/76 138/68   Site: Left Upper Arm    Position: Sitting    Cuff Size: Medium Adult    Pulse: 88    SpO2: 98%    Weight: 159 lb (72.1 kg)    Height: 5' 2.8\" (1.595 m)       Body mass index is 28.35 kg/m².       General Appearance: alert and oriented to person, place and time, well developed and well- nourished, in no acute distress  Skin: warm and dry, no rash or erythema  Head: normocephalic and atraumatic  Eyes: pupils equal, round, and reactive to light, extraocular eye movements intact, conjunctivae normal  ENT: tympanic membrane, external ear and ear canal normal bilaterally, nose without deformity, nasal mucosa and turbinates normal without polyps  Neck: supple and non-tender without mass, no thyromegaly or thyroid nodules, no cervical lymphadenopathy  Pulmonary/Chest: clear to auscultation bilaterally- no wheezes, rales or rhonchi, normal air movement, no respiratory distress  Cardiovascular: normal rate, regular rhythm, normal S1 and S2, no murmurs, rubs, clicks, or gallops, distal pulses intact, no carotid bruits  Abdomen: soft, non-tender, non-distended, normal bowel sounds, no masses or organomegaly  Extremities: no cyanosis, clubbing or edema  Musculoskeletal: normal range of motion, no joint swelling, deformity or tenderness  Neurologic: reflexes normal and symmetric, no cranial nerve deficit, gait, coordination and speech normal       Allergies   Allergen Reactions    Hctz [Hydrochlorothiazide]      Seizure due to low sodium    Lisinopril Other (See Comments)     Cough       Prior to Visit Medications    Medication Sig Taking? Authorizing Provider   pravastatin (PRAVACHOL) 40 MG tablet TAKE 1 TABLET BY MOUTH DAILY FOLLOW UP APPOINTMENT AND LABS ARE NEEDED. Yes Ayanna Whalen MD   losartan (COZAAR) 100 MG tablet TAKE 1 TABLET BY MOUTH DAILY FOLLOW UP APPOINTMENT AND LABS ARE NEEDED. Yes Ayanna Whalen MD   amLODIPine (NORVASC) 10 MG tablet TAKE 1 TABLET BY MOUTH EVERY DAY Yes Ayanna Whalen MD   nitroGLYCERIN (NITROSTAT) 0.4 MG SL tablet Place 1 tablet under the tongue every 5 minutes as needed for Chest pain Yes Elena Weaver MD   aspirin 81 MG tablet Take 81 mg by mouth daily.  Yes Historical Provider, MD   tamsulosin [facility]) 0.4 MG capsule Take 0.4 mg by mouth daily.    Yes Historical Provider, MD   ibuprofen (ADVIL;MOTRIN) 200 MG tablet Take 200 mg by mouth every 6 hours as needed for Pain  Patient not taking: Reported on 10/13/2022  Historical Provider, MD Faith (Including outside providers/suppliers regularly involved in providing care):   Patient Care Team:  Kendell Auguste MD as PCP - General (Family Medicine)  Shanika Linda MD as PCP - Hematology/Oncology (Hematology and Oncology)  Kendell Auguste MD as PCP - [facility] Empaneled Provider     Reviewed and updated this visit:  Tobacco  Allergies  Meds  Problems  Med Hx  Surg Hx  Soc Hx  Fam Hx

## 2022-12-28 DIAGNOSIS — I10 ESSENTIAL HYPERTENSION: ICD-10-CM

## 2022-12-28 NOTE — TELEPHONE ENCOUNTER
Requested Prescriptions     Pending Prescriptions Disp Refills    losartan (COZAAR) 100 MG tablet [Pharmacy Med Name: LOSARTAN POTASSIUM 100 MG TAB] 90 tablet 1     Sig: TAKE 1 TABLET BY MOUTH EVERY DAY        Last ov 10/13/22  Last lab 9/21/21  Next ov n/a

## 2022-12-29 RX ORDER — LOSARTAN POTASSIUM 100 MG/1
TABLET ORAL
Qty: 90 TABLET | Refills: 1 | Status: SHIPPED | OUTPATIENT
Start: 2022-12-29

## 2023-01-04 RX ORDER — PRAVASTATIN SODIUM 40 MG
40 TABLET ORAL DAILY
Qty: 90 TABLET | Refills: 0 | Status: SHIPPED | OUTPATIENT
Start: 2023-01-04

## 2023-01-04 NOTE — TELEPHONE ENCOUNTER
No future appointments. LOV 10/13/2022      Requested Prescriptions     Pending Prescriptions Disp Refills    pravastatin (PRAVACHOL) 40 MG tablet [Pharmacy Med Name: PRAVASTATIN SODIUM 40 MG TAB] 90 tablet 0     Sig: TAKE 1 TABLET BY MOUTH DAILY FOLLOW UP APPOINTMENT AND LABS ARE NEEDED.

## 2023-04-04 SDOH — HEALTH STABILITY: PHYSICAL HEALTH: ON AVERAGE, HOW MANY DAYS PER WEEK DO YOU ENGAGE IN MODERATE TO STRENUOUS EXERCISE (LIKE A BRISK WALK)?: 5 DAYS

## 2023-04-04 SDOH — HEALTH STABILITY: PHYSICAL HEALTH: ON AVERAGE, HOW MANY MINUTES DO YOU ENGAGE IN EXERCISE AT THIS LEVEL?: 60 MIN

## 2023-04-05 ENCOUNTER — OFFICE VISIT (OUTPATIENT)
Dept: FAMILY MEDICINE CLINIC | Age: 68
End: 2023-04-05
Payer: COMMERCIAL

## 2023-04-05 VITALS
OXYGEN SATURATION: 98 % | WEIGHT: 159.4 LBS | DIASTOLIC BLOOD PRESSURE: 88 MMHG | HEIGHT: 64 IN | HEART RATE: 76 BPM | SYSTOLIC BLOOD PRESSURE: 110 MMHG | TEMPERATURE: 97.1 F | BODY MASS INDEX: 27.21 KG/M2

## 2023-04-05 DIAGNOSIS — I10 PRIMARY HYPERTENSION: Primary | ICD-10-CM

## 2023-04-05 DIAGNOSIS — E83.110 HEREDITARY HEMOCHROMATOSIS (HCC): ICD-10-CM

## 2023-04-05 DIAGNOSIS — I25.10 CORONARY ARTERY DISEASE INVOLVING NATIVE CORONARY ARTERY OF NATIVE HEART WITHOUT ANGINA PECTORIS: ICD-10-CM

## 2023-04-05 DIAGNOSIS — N40.0 BENIGN PROSTATIC HYPERPLASIA, UNSPECIFIED WHETHER LOWER URINARY TRACT SYMPTOMS PRESENT: ICD-10-CM

## 2023-04-05 DIAGNOSIS — E78.2 MIXED HYPERLIPIDEMIA: ICD-10-CM

## 2023-04-05 PROCEDURE — 1123F ACP DISCUSS/DSCN MKR DOCD: CPT | Performed by: FAMILY MEDICINE

## 2023-04-05 PROCEDURE — 3017F COLORECTAL CA SCREEN DOC REV: CPT | Performed by: FAMILY MEDICINE

## 2023-04-05 PROCEDURE — 3079F DIAST BP 80-89 MM HG: CPT | Performed by: FAMILY MEDICINE

## 2023-04-05 PROCEDURE — G8427 DOCREV CUR MEDS BY ELIG CLIN: HCPCS | Performed by: FAMILY MEDICINE

## 2023-04-05 PROCEDURE — 99214 OFFICE O/P EST MOD 30 MIN: CPT | Performed by: FAMILY MEDICINE

## 2023-04-05 PROCEDURE — 1036F TOBACCO NON-USER: CPT | Performed by: FAMILY MEDICINE

## 2023-04-05 PROCEDURE — 3074F SYST BP LT 130 MM HG: CPT | Performed by: FAMILY MEDICINE

## 2023-04-05 PROCEDURE — G8417 CALC BMI ABV UP PARAM F/U: HCPCS | Performed by: FAMILY MEDICINE

## 2023-04-05 RX ORDER — PRAVASTATIN SODIUM 40 MG
40 TABLET ORAL DAILY
Qty: 90 TABLET | Refills: 1 | Status: SHIPPED | OUTPATIENT
Start: 2023-04-05

## 2023-04-05 RX ORDER — LOSARTAN POTASSIUM 100 MG/1
100 TABLET ORAL DAILY
Qty: 90 TABLET | Refills: 1 | Status: SHIPPED | OUTPATIENT
Start: 2023-04-05

## 2023-04-05 RX ORDER — AMLODIPINE BESYLATE 10 MG/1
10 TABLET ORAL DAILY
Qty: 90 TABLET | Refills: 1 | Status: SHIPPED | OUTPATIENT
Start: 2023-04-05

## 2023-04-05 SDOH — ECONOMIC STABILITY: FOOD INSECURITY: WITHIN THE PAST 12 MONTHS, YOU WORRIED THAT YOUR FOOD WOULD RUN OUT BEFORE YOU GOT MONEY TO BUY MORE.: NEVER TRUE

## 2023-04-05 SDOH — ECONOMIC STABILITY: FOOD INSECURITY: WITHIN THE PAST 12 MONTHS, THE FOOD YOU BOUGHT JUST DIDN'T LAST AND YOU DIDN'T HAVE MONEY TO GET MORE.: NEVER TRUE

## 2023-04-05 SDOH — ECONOMIC STABILITY: INCOME INSECURITY: HOW HARD IS IT FOR YOU TO PAY FOR THE VERY BASICS LIKE FOOD, HOUSING, MEDICAL CARE, AND HEATING?: NOT HARD AT ALL

## 2023-04-05 SDOH — ECONOMIC STABILITY: HOUSING INSECURITY
IN THE LAST 12 MONTHS, WAS THERE A TIME WHEN YOU DID NOT HAVE A STEADY PLACE TO SLEEP OR SLEPT IN A SHELTER (INCLUDING NOW)?: NO

## 2023-04-05 ASSESSMENT — PATIENT HEALTH QUESTIONNAIRE - PHQ9
SUM OF ALL RESPONSES TO PHQ QUESTIONS 1-9: 0
SUM OF ALL RESPONSES TO PHQ QUESTIONS 1-9: 0
1. LITTLE INTEREST OR PLEASURE IN DOING THINGS: 0
SUM OF ALL RESPONSES TO PHQ QUESTIONS 1-9: 0
SUM OF ALL RESPONSES TO PHQ QUESTIONS 1-9: 0
2. FEELING DOWN, DEPRESSED OR HOPELESS: 0
SUM OF ALL RESPONSES TO PHQ9 QUESTIONS 1 & 2: 0

## 2023-04-05 NOTE — PATIENT INSTRUCTIONS
Patient Education        High Blood Pressure: Care Instructions  Overview     It's normal for blood pressure to go up and down throughout the day. But if it stays up, you have high blood pressure. Another name for high blood pressure is hypertension. Despite what a lot of people think, high blood pressure usually doesn't cause headaches or make you feel dizzy or lightheaded. It usually has no symptoms. But it does increase your risk of stroke, heart attack, and other problems. You and your doctor will talk about your risks of these problems based on your blood pressure. Your doctor will give you a goal for your blood pressure. Your goal will be based on your health and your age. Lifestyle changes, such as eating healthy and being active, are always important to help lower blood pressure. You might also take medicine to reach your blood pressure goal.  Follow-up care is a key part of your treatment and safety. Be sure to make and go to all appointments, and call your doctor if you are having problems. It's also a good idea to know your test results and keep a list of the medicines you take. How can you care for yourself at home? Medical treatment  If you stop taking your medicine, your blood pressure will go back up. You may take one or more types of medicine to lower your blood pressure. Be safe with medicines. Take your medicine exactly as prescribed. Call your doctor if you think you are having a problem with your medicine. Talk to your doctor before you start taking aspirin every day. Aspirin can help certain people lower their risk of a heart attack or stroke. But taking aspirin isn't right for everyone, because it can cause serious bleeding. See your doctor regularly. You may need to see the doctor more often at first or until your blood pressure comes down. If you are taking blood pressure medicine, talk to your doctor before you take decongestants or anti-inflammatory medicine, such as ibuprofen.  Some

## 2023-04-05 NOTE — PROGRESS NOTES
angina pectoris    Benign prostatic hyperplasia, unspecified whether lower urinary tract symptoms present      Advise given:  - Take all prescription medication as prescribed.  - Drink plenty of fluids.   - Eat five servings of fruits and vegetables everyday.  - Discussed importance of regular exercise and recommended starting or continuing a regular exercise program for good health.   - The importance of annual eye exams was reviewed.   - The importance of proper foot care and regularly checking feet to prevent sores and possibly loss of limbs was reviewed.   - The importance of keeping the blood pressure monitoring to prevent stroke, heart attacks, kidney failure, blindness, and loss of limbs was reviewed.   - Appropriate handout were given to the patient.  -  All the questions and concerns were appropriately answered.  - Patient / family member / caregiver verbalized understanding of patient instructions from today's visit.   - The patient was advised to follow up with me in 6 months for recheck or can call me before if has any other questions or concerns.

## 2023-10-05 DIAGNOSIS — E78.2 MIXED HYPERLIPIDEMIA: ICD-10-CM

## 2023-10-05 NOTE — TELEPHONE ENCOUNTER
Please advise the patient to be seen earlier than the scheduled appointment. We can see him tomorrow if he is agreeable.

## 2023-10-11 ENCOUNTER — OFFICE VISIT (OUTPATIENT)
Dept: FAMILY MEDICINE CLINIC | Age: 68
End: 2023-10-11
Payer: COMMERCIAL

## 2023-10-11 VITALS
OXYGEN SATURATION: 95 % | DIASTOLIC BLOOD PRESSURE: 82 MMHG | SYSTOLIC BLOOD PRESSURE: 130 MMHG | WEIGHT: 158.8 LBS | HEART RATE: 79 BPM | TEMPERATURE: 96.9 F | BODY MASS INDEX: 27.11 KG/M2 | HEIGHT: 64 IN

## 2023-10-11 DIAGNOSIS — I10 PRIMARY HYPERTENSION: Primary | ICD-10-CM

## 2023-10-11 DIAGNOSIS — I25.10 CORONARY ARTERY DISEASE INVOLVING NATIVE CORONARY ARTERY OF NATIVE HEART WITHOUT ANGINA PECTORIS: ICD-10-CM

## 2023-10-11 DIAGNOSIS — N40.0 BENIGN PROSTATIC HYPERPLASIA, UNSPECIFIED WHETHER LOWER URINARY TRACT SYMPTOMS PRESENT: ICD-10-CM

## 2023-10-11 DIAGNOSIS — E78.2 MIXED HYPERLIPIDEMIA: ICD-10-CM

## 2023-10-11 PROCEDURE — 99214 OFFICE O/P EST MOD 30 MIN: CPT | Performed by: FAMILY MEDICINE

## 2023-10-11 PROCEDURE — 3017F COLORECTAL CA SCREEN DOC REV: CPT | Performed by: FAMILY MEDICINE

## 2023-10-11 PROCEDURE — 3079F DIAST BP 80-89 MM HG: CPT | Performed by: FAMILY MEDICINE

## 2023-10-11 PROCEDURE — G8427 DOCREV CUR MEDS BY ELIG CLIN: HCPCS | Performed by: FAMILY MEDICINE

## 2023-10-11 PROCEDURE — 1036F TOBACCO NON-USER: CPT | Performed by: FAMILY MEDICINE

## 2023-10-11 PROCEDURE — G8417 CALC BMI ABV UP PARAM F/U: HCPCS | Performed by: FAMILY MEDICINE

## 2023-10-11 PROCEDURE — 1123F ACP DISCUSS/DSCN MKR DOCD: CPT | Performed by: FAMILY MEDICINE

## 2023-10-11 PROCEDURE — G8484 FLU IMMUNIZE NO ADMIN: HCPCS | Performed by: FAMILY MEDICINE

## 2023-10-11 PROCEDURE — 3075F SYST BP GE 130 - 139MM HG: CPT | Performed by: FAMILY MEDICINE

## 2023-10-11 RX ORDER — LOSARTAN POTASSIUM 100 MG/1
100 TABLET ORAL DAILY
Qty: 90 TABLET | Refills: 1 | Status: SHIPPED | OUTPATIENT
Start: 2023-10-11

## 2023-10-11 RX ORDER — PRAVASTATIN SODIUM 40 MG
40 TABLET ORAL DAILY
Qty: 90 TABLET | Refills: 1 | Status: SHIPPED | OUTPATIENT
Start: 2023-10-11

## 2023-10-11 RX ORDER — PRAVASTATIN SODIUM 40 MG
40 TABLET ORAL DAILY
Qty: 90 TABLET | Refills: 1 | OUTPATIENT
Start: 2023-10-11

## 2023-10-11 RX ORDER — AMLODIPINE BESYLATE 10 MG/1
10 TABLET ORAL DAILY
Qty: 90 TABLET | Refills: 1 | Status: CANCELLED | OUTPATIENT
Start: 2023-10-11

## 2023-10-11 NOTE — PROGRESS NOTES
were moving fine. Full range of motion is   present. No deformity noted. Peripheral pulses were felt. No lower   extremity edema. Neurovascular integrity maintained. NEUROLOGICAL:  The patient was alert, conscious, and cooperative. Oriented   to time, place, and person. Muscle power in all 4 limbs is 5/5. Cranial   nerves II thru XII are grossly intact. No sensory or motor loss. Assessment/Plan     Diagnoses and all orders for this visit:    Primary hypertension  -     losartan (COZAAR) 100 MG tablet; Take 1 tablet by mouth daily    Mixed hyperlipidemia  -     pravastatin (PRAVACHOL) 40 MG tablet; Take 1 tablet by mouth daily    Coronary artery disease involving native coronary artery of native heart without angina pectoris    Benign prostatic hyperplasia, unspecified whether lower urinary tract symptoms present      Advise given:  - Take all prescription medication as prescribed. - Drink plenty of fluids.   - Eat five servings of fruits and vegetables everyday. - Discussed importance of regular exercise and recommended starting or continuing a regular exercise program for good health. - The importance of annual eye exams was reviewed. - The importance of proper foot care and regularly checking feet to prevent sores and possibly loss of limbs was reviewed. - The importance of keeping the blood pressure monitoring to prevent stroke, heart attacks, kidney failure, blindness, and loss of limbs was reviewed. - Appropriate handout were given to the patient. -  All the questions and concerns were appropriately answered. - Patient / family member / caregiver verbalized understanding of patient instructions from today's visit. - The patient was advised to follow up with me in 6 months for recheck or can call me before if has any other questions or concerns.

## 2024-04-10 ENCOUNTER — OFFICE VISIT (OUTPATIENT)
Dept: FAMILY MEDICINE CLINIC | Age: 69
End: 2024-04-10
Payer: COMMERCIAL

## 2024-04-10 VITALS
SYSTOLIC BLOOD PRESSURE: 120 MMHG | BODY MASS INDEX: 27.93 KG/M2 | HEIGHT: 64 IN | WEIGHT: 163.6 LBS | DIASTOLIC BLOOD PRESSURE: 80 MMHG | OXYGEN SATURATION: 97 % | HEART RATE: 82 BPM | TEMPERATURE: 97.7 F

## 2024-04-10 DIAGNOSIS — I25.10 CORONARY ARTERY DISEASE INVOLVING NATIVE CORONARY ARTERY OF NATIVE HEART WITHOUT ANGINA PECTORIS: ICD-10-CM

## 2024-04-10 DIAGNOSIS — E78.2 MIXED HYPERLIPIDEMIA: ICD-10-CM

## 2024-04-10 DIAGNOSIS — I10 PRIMARY HYPERTENSION: Primary | ICD-10-CM

## 2024-04-10 DIAGNOSIS — Z12.5 PROSTATE CANCER SCREENING: ICD-10-CM

## 2024-04-10 PROCEDURE — 3074F SYST BP LT 130 MM HG: CPT | Performed by: FAMILY MEDICINE

## 2024-04-10 PROCEDURE — G8427 DOCREV CUR MEDS BY ELIG CLIN: HCPCS | Performed by: FAMILY MEDICINE

## 2024-04-10 PROCEDURE — 3017F COLORECTAL CA SCREEN DOC REV: CPT | Performed by: FAMILY MEDICINE

## 2024-04-10 PROCEDURE — 1123F ACP DISCUSS/DSCN MKR DOCD: CPT | Performed by: FAMILY MEDICINE

## 2024-04-10 PROCEDURE — G8417 CALC BMI ABV UP PARAM F/U: HCPCS | Performed by: FAMILY MEDICINE

## 2024-04-10 PROCEDURE — 99214 OFFICE O/P EST MOD 30 MIN: CPT | Performed by: FAMILY MEDICINE

## 2024-04-10 PROCEDURE — 1036F TOBACCO NON-USER: CPT | Performed by: FAMILY MEDICINE

## 2024-04-10 PROCEDURE — 3079F DIAST BP 80-89 MM HG: CPT | Performed by: FAMILY MEDICINE

## 2024-04-10 RX ORDER — LOSARTAN POTASSIUM 100 MG/1
100 TABLET ORAL DAILY
Qty: 90 TABLET | Refills: 0 | Status: SHIPPED | OUTPATIENT
Start: 2024-04-10

## 2024-04-10 RX ORDER — AMLODIPINE BESYLATE 10 MG/1
10 TABLET ORAL EVERY OTHER DAY
Qty: 90 TABLET | Refills: 0 | Status: SHIPPED | OUTPATIENT
Start: 2024-04-10

## 2024-04-10 RX ORDER — PRAVASTATIN SODIUM 40 MG
40 TABLET ORAL DAILY
Qty: 90 TABLET | Refills: 0 | Status: SHIPPED | OUTPATIENT
Start: 2024-04-10

## 2024-04-10 SDOH — ECONOMIC STABILITY: FOOD INSECURITY: WITHIN THE PAST 12 MONTHS, THE FOOD YOU BOUGHT JUST DIDN'T LAST AND YOU DIDN'T HAVE MONEY TO GET MORE.: NEVER TRUE

## 2024-04-10 SDOH — ECONOMIC STABILITY: FOOD INSECURITY: WITHIN THE PAST 12 MONTHS, YOU WORRIED THAT YOUR FOOD WOULD RUN OUT BEFORE YOU GOT MONEY TO BUY MORE.: NEVER TRUE

## 2024-04-10 SDOH — ECONOMIC STABILITY: INCOME INSECURITY: HOW HARD IS IT FOR YOU TO PAY FOR THE VERY BASICS LIKE FOOD, HOUSING, MEDICAL CARE, AND HEATING?: NOT HARD AT ALL

## 2024-04-10 SDOH — ECONOMIC STABILITY: TRANSPORTATION INSECURITY
IN THE PAST 12 MONTHS, HAS LACK OF TRANSPORTATION KEPT YOU FROM MEETINGS, WORK, OR FROM GETTING THINGS NEEDED FOR DAILY LIVING?: NO

## 2024-04-10 ASSESSMENT — PATIENT HEALTH QUESTIONNAIRE - PHQ9
2. FEELING DOWN, DEPRESSED OR HOPELESS: NOT AT ALL
SUM OF ALL RESPONSES TO PHQ QUESTIONS 1-9: 0
1. LITTLE INTEREST OR PLEASURE IN DOING THINGS: NOT AT ALL
SUM OF ALL RESPONSES TO PHQ QUESTIONS 1-9: 0
SUM OF ALL RESPONSES TO PHQ QUESTIONS 1-9: 0
1. LITTLE INTEREST OR PLEASURE IN DOING THINGS: NOT AT ALL
SUM OF ALL RESPONSES TO PHQ QUESTIONS 1-9: 0
SUM OF ALL RESPONSES TO PHQ9 QUESTIONS 1 & 2: 0
2. FEELING DOWN, DEPRESSED OR HOPELESS: NOT AT ALL
SUM OF ALL RESPONSES TO PHQ9 QUESTIONS 1 & 2: 0

## 2024-04-10 NOTE — PROGRESS NOTES
Subjective    Billy Singletno is a 68 y.o. Male who came into the clinic today for recheck and for appropriate management.    The patient was last seen by me on 10/11/2023.  The patient informs me that he has been taking his medications   as prescribed and has not noticed any side effect from the same.  The patient needed refill of his medication   today.  The patient gets most of his blood work through his hematologist and as per the patient it has been   within normal limits.  I do not have those results to review at this time.  The patient would like to have his lipid   panel and PSA checked at this time since it has been a while it has been checked.  No other questions or   concerns today and all the question and concerns were appropriately answered.    I reviewed and discussed below mentioned labs with the patient today:    Lab Results   Component Value Date/Time    WBC 6.1 09/21/2021 10:54 AM    RBC 4.35 09/21/2021 10:54 AM    RBC 4.60 06/21/2017 02:09 PM    MCV 97.0 09/21/2021 10:54 AM    MCHC 35.6 09/21/2021 10:54 AM     Lab Results   Component Value Date/Time    ANIONGAP 15 09/21/2021 10:54 AM    GLUCOSE 82 09/21/2021 10:54 AM    GLUCOSE 100 06/21/2017 02:09 PM    BUN 5 09/21/2021 10:54 AM    CREATININE 0.6 09/21/2021 10:54 AM    AGRATIO 2.2 09/21/2021 10:54 AM    CALCIUM 9.4 09/21/2021 10:54 AM     Lab Results   Component Value Date/Time    TSH 1.29 12/26/2013 11:32 AM     Lab Results   Component Value Date/Time    PSA 0.66 05/05/2014 12:00 AM       Past Medical History:   Diagnosis Date    Basal cell carcinoma (BCC) of chest 2021    BPH (benign prostatic hyperplasia)     CAD (coronary artery disease)     Chronic prostatitis 1992, 4/17    DDD (degenerative disc disease), lumbar     L3- S1    GERD (gastroesophageal reflux disease)     Hemochromatosis 2001    Hypochloremia 06/2016    Hypokalemia 06/2016    Leukocytopenia 06/2016    Leukopenia     Lumbar disc herniation     L5-S1    Lumbar stenosis     per

## 2024-04-11 DIAGNOSIS — E78.2 MIXED HYPERLIPIDEMIA: ICD-10-CM

## 2024-04-11 DIAGNOSIS — Z12.5 PROSTATE CANCER SCREENING: ICD-10-CM

## 2024-04-11 LAB
CHOLEST SERPL-MCNC: 173 MG/DL (ref 0–199)
HDLC SERPL-MCNC: 75 MG/DL (ref 40–60)
LDLC SERPL CALC-MCNC: 75 MG/DL
PSA SERPL DL<=0.01 NG/ML-MCNC: 1.12 NG/ML (ref 0–4)
TRIGL SERPL-MCNC: 115 MG/DL (ref 0–150)
VLDLC SERPL CALC-MCNC: 23 MG/DL

## 2024-06-04 ENCOUNTER — OFFICE VISIT (OUTPATIENT)
Dept: FAMILY MEDICINE CLINIC | Age: 69
End: 2024-06-04
Payer: COMMERCIAL

## 2024-06-04 VITALS
TEMPERATURE: 96.9 F | HEART RATE: 85 BPM | BODY MASS INDEX: 27.46 KG/M2 | DIASTOLIC BLOOD PRESSURE: 70 MMHG | OXYGEN SATURATION: 97 % | SYSTOLIC BLOOD PRESSURE: 126 MMHG | WEIGHT: 160 LBS

## 2024-06-04 DIAGNOSIS — Z98.61 S/P PTCA (PERCUTANEOUS TRANSLUMINAL CORONARY ANGIOPLASTY): ICD-10-CM

## 2024-06-04 DIAGNOSIS — E83.110 HEREDITARY HEMOCHROMATOSIS (HCC): Primary | ICD-10-CM

## 2024-06-04 DIAGNOSIS — M51.36 DDD (DEGENERATIVE DISC DISEASE), LUMBAR: ICD-10-CM

## 2024-06-04 DIAGNOSIS — E78.2 MIXED HYPERLIPIDEMIA: ICD-10-CM

## 2024-06-04 DIAGNOSIS — I25.10 CORONARY ARTERY DISEASE INVOLVING NATIVE CORONARY ARTERY OF NATIVE HEART WITHOUT ANGINA PECTORIS: ICD-10-CM

## 2024-06-04 DIAGNOSIS — N40.1 BENIGN PROSTATIC HYPERPLASIA WITH NOCTURIA: ICD-10-CM

## 2024-06-04 DIAGNOSIS — I10 PRIMARY HYPERTENSION: ICD-10-CM

## 2024-06-04 DIAGNOSIS — R35.1 BENIGN PROSTATIC HYPERPLASIA WITH NOCTURIA: ICD-10-CM

## 2024-06-04 DIAGNOSIS — C44.519 BASAL CELL CARCINOMA (BCC) OF CHEST: ICD-10-CM

## 2024-06-04 PROBLEM — M19.012 OSTEOARTHRITIS OF LEFT AC (ACROMIOCLAVICULAR) JOINT: Status: RESOLVED | Noted: 2020-09-01 | Resolved: 2024-06-04

## 2024-06-04 PROCEDURE — G8427 DOCREV CUR MEDS BY ELIG CLIN: HCPCS | Performed by: NURSE PRACTITIONER

## 2024-06-04 PROCEDURE — 3074F SYST BP LT 130 MM HG: CPT | Performed by: NURSE PRACTITIONER

## 2024-06-04 PROCEDURE — 99214 OFFICE O/P EST MOD 30 MIN: CPT | Performed by: NURSE PRACTITIONER

## 2024-06-04 PROCEDURE — G2211 COMPLEX E/M VISIT ADD ON: HCPCS | Performed by: NURSE PRACTITIONER

## 2024-06-04 PROCEDURE — 3017F COLORECTAL CA SCREEN DOC REV: CPT | Performed by: NURSE PRACTITIONER

## 2024-06-04 PROCEDURE — 3078F DIAST BP <80 MM HG: CPT | Performed by: NURSE PRACTITIONER

## 2024-06-04 PROCEDURE — 1036F TOBACCO NON-USER: CPT | Performed by: NURSE PRACTITIONER

## 2024-06-04 PROCEDURE — G8417 CALC BMI ABV UP PARAM F/U: HCPCS | Performed by: NURSE PRACTITIONER

## 2024-06-04 PROCEDURE — 1123F ACP DISCUSS/DSCN MKR DOCD: CPT | Performed by: NURSE PRACTITIONER

## 2024-06-04 SDOH — HEALTH STABILITY: PHYSICAL HEALTH: ON AVERAGE, HOW MANY MINUTES DO YOU ENGAGE IN EXERCISE AT THIS LEVEL?: 120 MIN

## 2024-06-04 SDOH — HEALTH STABILITY: PHYSICAL HEALTH: ON AVERAGE, HOW MANY DAYS PER WEEK DO YOU ENGAGE IN MODERATE TO STRENUOUS EXERCISE (LIKE A BRISK WALK)?: 5 DAYS

## 2024-06-04 NOTE — PROGRESS NOTES
Billy Singleton (:  1955) is a 68 y.o. male,Established patient, here for evaluation of the following chief complaint(s):  New Patient      ASSESSMENT/PLAN:  1. Hereditary hemochromatosis (HCC)  Assessment & Plan:  Chronic-stable.  Patient managed by [facility].  H&H .  Asymptomatic no treatment  2. S/P PTCA (percutaneous transluminal coronary angioplasty)  Assessment & Plan:  Chronic-stable CAD.  Patient follows with cardiology.  Has nitroglycerin to take as needed however reports he has not had to take any.  3. Primary hypertension  Assessment & Plan:   Chronic-stable patient to continue on Norvasc.  Tolerating medication well without negative side effects.  4. Mixed hyperlipidemia  Assessment & Plan:  Lab Results   Component Value Date    CHOL 173 2024    TRIG 115 2024    HDL 75 (H) 2024    LDL 75 2024    VLDL 23 2024     Chronic-stable patient to continue with Pravachol 40 mg daily.  Tolerating medications without side effects  5. DDD (degenerative disc disease), lumbar  Assessment & Plan:  Chronic- stable- no meds, PRN ibuprofen. Occ flare with rt leg numbness  6. Coronary artery disease involving native coronary artery of native heart without angina pectoris  Assessment & Plan:  Chronic- stable. Sees Stacy. Has NTG if needed, Pravachol  7. Benign prostatic hyperplasia with nocturia  Assessment & Plan:  Chronic- controlled, Well controlled with Flomax  8. Basal cell carcinoma (BCC) of chest  Assessment & Plan:  Chronic- sees Derm 2x a year    No follow-ups on file.    SUBJECTIVE/OBJECTIVE:  Patient presents to establish care today following the departure of his previous physician.      Current Outpatient Medications   Medication Sig Dispense Refill    amLODIPine (NORVASC) 10 MG tablet Take 1 tablet by mouth every other day 90 tablet 0    losartan (COZAAR) 100 MG tablet Take 1 tablet by mouth daily 90 tablet 0    pravastatin (PRAVACHOL) 40 MG tablet Take 1 tablet by mouth

## 2024-06-05 NOTE — ASSESSMENT & PLAN NOTE
Chronic-stable CAD.  Patient follows with cardiology.  Has nitroglycerin to take as needed however reports he has not had to take any.

## 2024-06-05 NOTE — ASSESSMENT & PLAN NOTE
Lab Results   Component Value Date    CHOL 173 04/11/2024    TRIG 115 04/11/2024    HDL 75 (H) 04/11/2024    LDL 75 04/11/2024    VLDL 23 04/11/2024     Chronic-stable patient to continue with Pravachol 40 mg daily.  Tolerating medications without side effects

## 2024-06-05 NOTE — ASSESSMENT & PLAN NOTE
Chronic-stable.  Patient managed by [facility].  H&H 11.7/33  Asymptomatic no treatment

## 2024-06-05 NOTE — ASSESSMENT & PLAN NOTE
Chronic-stable patient to continue on Norvasc.  Tolerating medication well without negative side effects.

## 2024-07-06 DIAGNOSIS — E78.2 MIXED HYPERLIPIDEMIA: ICD-10-CM

## 2024-07-08 NOTE — TELEPHONE ENCOUNTER
Requested Prescriptions     Pending Prescriptions Disp Refills    pravastatin (PRAVACHOL) 40 MG tablet [Pharmacy Med Name: PRAVASTATIN SODIUM 40 MG TAB] 90 tablet 0     Sig: TAKE 1 TABLET BY MOUTH EVERY DAY     LOV 6.4.24  No FOV scheduled

## 2024-07-10 RX ORDER — PRAVASTATIN SODIUM 40 MG
40 TABLET ORAL DAILY
Qty: 90 TABLET | Refills: 0 | Status: SHIPPED | OUTPATIENT
Start: 2024-07-10

## 2024-10-06 DIAGNOSIS — E78.2 MIXED HYPERLIPIDEMIA: ICD-10-CM

## 2024-10-07 RX ORDER — PRAVASTATIN SODIUM 40 MG
40 TABLET ORAL DAILY
Qty: 90 TABLET | Refills: 0 | Status: SHIPPED | OUTPATIENT
Start: 2024-10-07

## 2024-10-09 SDOH — HEALTH STABILITY: PHYSICAL HEALTH: ON AVERAGE, HOW MANY DAYS PER WEEK DO YOU ENGAGE IN MODERATE TO STRENUOUS EXERCISE (LIKE A BRISK WALK)?: 4 DAYS

## 2024-10-09 SDOH — HEALTH STABILITY: PHYSICAL HEALTH: ON AVERAGE, HOW MANY MINUTES DO YOU ENGAGE IN EXERCISE AT THIS LEVEL?: 120 MIN

## 2024-10-09 ASSESSMENT — LIFESTYLE VARIABLES
HOW OFTEN DO YOU HAVE SIX OR MORE DRINKS ON ONE OCCASION: 1
HOW MANY STANDARD DRINKS CONTAINING ALCOHOL DO YOU HAVE ON A TYPICAL DAY: 1 OR 2
HOW MANY STANDARD DRINKS CONTAINING ALCOHOL DO YOU HAVE ON A TYPICAL DAY: 1

## 2024-10-09 ASSESSMENT — PATIENT HEALTH QUESTIONNAIRE - PHQ9
SUM OF ALL RESPONSES TO PHQ QUESTIONS 1-9: 0
1. LITTLE INTEREST OR PLEASURE IN DOING THINGS: NOT AT ALL
SUM OF ALL RESPONSES TO PHQ QUESTIONS 1-9: 0
SUM OF ALL RESPONSES TO PHQ9 QUESTIONS 1 & 2: 0
2. FEELING DOWN, DEPRESSED OR HOPELESS: NOT AT ALL
SUM OF ALL RESPONSES TO PHQ QUESTIONS 1-9: 0
SUM OF ALL RESPONSES TO PHQ QUESTIONS 1-9: 0

## 2024-10-16 ENCOUNTER — OFFICE VISIT (OUTPATIENT)
Dept: FAMILY MEDICINE CLINIC | Age: 69
End: 2024-10-16
Payer: MEDICARE

## 2024-10-16 VITALS
TEMPERATURE: 96.9 F | HEIGHT: 64 IN | SYSTOLIC BLOOD PRESSURE: 136 MMHG | BODY MASS INDEX: 26.8 KG/M2 | DIASTOLIC BLOOD PRESSURE: 82 MMHG | OXYGEN SATURATION: 92 % | HEART RATE: 93 BPM | WEIGHT: 157 LBS

## 2024-10-16 DIAGNOSIS — R35.1 BENIGN PROSTATIC HYPERPLASIA WITH NOCTURIA: ICD-10-CM

## 2024-10-16 DIAGNOSIS — Z98.61 S/P PTCA (PERCUTANEOUS TRANSLUMINAL CORONARY ANGIOPLASTY): ICD-10-CM

## 2024-10-16 DIAGNOSIS — E78.2 MIXED HYPERLIPIDEMIA: ICD-10-CM

## 2024-10-16 DIAGNOSIS — E83.110 HEREDITARY HEMOCHROMATOSIS (HCC): ICD-10-CM

## 2024-10-16 DIAGNOSIS — N40.1 BENIGN PROSTATIC HYPERPLASIA WITH NOCTURIA: ICD-10-CM

## 2024-10-16 DIAGNOSIS — I10 PRIMARY HYPERTENSION: ICD-10-CM

## 2024-10-16 DIAGNOSIS — Z00.00 MEDICARE ANNUAL WELLNESS VISIT, SUBSEQUENT: Primary | ICD-10-CM

## 2024-10-16 PROCEDURE — 99397 PER PM REEVAL EST PAT 65+ YR: CPT | Performed by: NURSE PRACTITIONER

## 2024-10-16 PROCEDURE — 3075F SYST BP GE 130 - 139MM HG: CPT | Performed by: NURSE PRACTITIONER

## 2024-10-16 PROCEDURE — G0438 PPPS, INITIAL VISIT: HCPCS | Performed by: NURSE PRACTITIONER

## 2024-10-16 PROCEDURE — 1123F ACP DISCUSS/DSCN MKR DOCD: CPT | Performed by: NURSE PRACTITIONER

## 2024-10-16 PROCEDURE — 3078F DIAST BP <80 MM HG: CPT | Performed by: NURSE PRACTITIONER

## 2024-10-16 ASSESSMENT — LIFESTYLE VARIABLES
HOW MANY STANDARD DRINKS CONTAINING ALCOHOL DO YOU HAVE ON A TYPICAL DAY: PATIENT DOES NOT DRINK
HOW OFTEN DO YOU HAVE A DRINK CONTAINING ALCOHOL: NEVER

## 2024-10-16 NOTE — PROGRESS NOTES
Medicare Annual Wellness Visit    Billy Singleton is here for Medicare AWV    Assessment & Plan   Medicare annual wellness visit, subsequent  Mixed hyperlipidemia  Hereditary hemochromatosis (HCC)  S/P PTCA (percutaneous transluminal coronary angioplasty)  Primary hypertension  Benign prostatic hyperplasia with nocturia    Recommendations for Preventive Services Due: see orders and patient instructions/AVS.  Recommended screening schedule for the next 5-10 years is provided to the patient in written form: see Patient Instructions/AVS.     Return in 1 year (on 10/16/2025) for Medicare AWV.     Subjective     Occ positional vertigo-will do epleys to correct has gone to physical therapy to have this done before.  Lives at home with his wife, no additional needs at this time.  They are both still quite independent  Gets labs done through [facility] where he is followed with hereditary hemochromatosis.  Follows cardiology as well  Sees dermatology twice a year  Mild arthritic pain bilateral hands DIP PIP joints-recommend Voltaren gel  Patient's complete Health Risk Assessment and screening values have been reviewed and are found in Flowsheets. The following problems were reviewed today and where indicated follow up appointments were made and/or referrals ordered.    No Positive Risk Factors identified today.                                    Objective   Vitals:    10/16/24 0847 10/16/24 0917   BP: (!) 146/78 136/82   Pulse: 93    Temp: 96.9 °F (36.1 °C)    SpO2: 92%    Weight: 71.2 kg (157 lb)    Height: 1.626 m (5' 4\")       Body mass index is 26.95 kg/m².      General Appearance: alert and oriented to person, place and time, well developed and well- nourished, in no acute distress  Skin: warm and dry, no rash or erythema  Head: normocephalic and atraumatic  Eyes: pupils equal, round, and reactive to light, extraocular eye movements intact, conjunctivae normal  ENT: tympanic membrane, external ear and ear canal normal

## 2025-01-02 DIAGNOSIS — E78.2 MIXED HYPERLIPIDEMIA: ICD-10-CM

## 2025-01-02 RX ORDER — PRAVASTATIN SODIUM 40 MG
40 TABLET ORAL DAILY
Qty: 90 TABLET | Refills: 0 | Status: SHIPPED | OUTPATIENT
Start: 2025-01-02

## 2025-01-02 NOTE — TELEPHONE ENCOUNTER
Medication:   Requested Prescriptions     Pending Prescriptions Disp Refills    pravastatin (PRAVACHOL) 40 MG tablet [Pharmacy Med Name: PRAVASTATIN SODIUM 40 MG TAB] 90 tablet 0     Sig: TAKE 1 TABLET BY MOUTH EVERY DAY     Last Filled:      Last appt: 4/10/2024   Next appt: Visit date not found    Last OARRS:        No data to display

## 2025-01-03 DIAGNOSIS — I10 PRIMARY HYPERTENSION: ICD-10-CM

## 2025-01-03 NOTE — TELEPHONE ENCOUNTER
Medication:   Requested Prescriptions     Pending Prescriptions Disp Refills    losartan (COZAAR) 100 MG tablet 90 tablet 0     Sig: Take 1 tablet by mouth daily     Last Filled:      Last appt: 10/16/2024   Next appt: Visit date not found    Last OARRS:        No data to display

## 2025-01-04 RX ORDER — LOSARTAN POTASSIUM 100 MG/1
100 TABLET ORAL DAILY
Qty: 90 TABLET | Refills: 0 | Status: SHIPPED | OUTPATIENT
Start: 2025-01-04

## 2025-01-22 SDOH — HEALTH STABILITY: PHYSICAL HEALTH: ON AVERAGE, HOW MANY DAYS PER WEEK DO YOU ENGAGE IN MODERATE TO STRENUOUS EXERCISE (LIKE A BRISK WALK)?: 3 DAYS

## 2025-01-22 SDOH — HEALTH STABILITY: PHYSICAL HEALTH: ON AVERAGE, HOW MANY MINUTES DO YOU ENGAGE IN EXERCISE AT THIS LEVEL?: 150+ MIN

## 2025-01-22 ASSESSMENT — LIFESTYLE VARIABLES
HOW OFTEN DO YOU HAVE A DRINK CONTAINING ALCOHOL: 4
HAVE YOU OR SOMEONE ELSE BEEN INJURED AS A RESULT OF YOUR DRINKING: NO
HOW OFTEN DURING THE LAST YEAR HAVE YOU FAILED TO DO WHAT WAS NORMALLY EXPECTED FROM YOU BECAUSE OF DRINKING: NEVER
HOW OFTEN DO YOU HAVE SIX OR MORE DRINKS ON ONE OCCASION: 2
HOW OFTEN DURING THE LAST YEAR HAVE YOU FAILED TO DO WHAT WAS NORMALLY EXPECTED FROM YOU BECAUSE OF DRINKING: NEVER
HAS A RELATIVE, FRIEND, DOCTOR, OR ANOTHER HEALTH PROFESSIONAL EXPRESSED CONCERN ABOUT YOUR DRINKING OR SUGGESTED YOU CUT DOWN: NO
HOW OFTEN DURING THE LAST YEAR HAVE YOU NEEDED AN ALCOHOLIC DRINK FIRST THING IN THE MORNING TO GET YOURSELF GOING AFTER A NIGHT OF HEAVY DRINKING: NEVER
HAVE YOU OR SOMEONE ELSE BEEN INJURED AS A RESULT OF YOUR DRINKING: NO
HOW OFTEN DO YOU HAVE A DRINK CONTAINING ALCOHOL: 2-3 TIMES A WEEK
HOW OFTEN DURING THE LAST YEAR HAVE YOU FOUND THAT YOU WERE NOT ABLE TO STOP DRINKING ONCE YOU HAD STARTED: LESS THAN MONTHLY
HOW OFTEN DURING THE LAST YEAR HAVE YOU HAD A FEELING OF GUILT OR REMORSE AFTER DRINKING: LESS THAN MONTHLY
HAS A RELATIVE, FRIEND, DOCTOR, OR ANOTHER HEALTH PROFESSIONAL EXPRESSED CONCERN ABOUT YOUR DRINKING OR SUGGESTED YOU CUT DOWN: NO
HOW OFTEN DURING THE LAST YEAR HAVE YOU NEEDED AN ALCOHOLIC DRINK FIRST THING IN THE MORNING TO GET YOURSELF GOING AFTER A NIGHT OF HEAVY DRINKING: NEVER
HOW OFTEN DURING THE LAST YEAR HAVE YOU FOUND THAT YOU WERE NOT ABLE TO STOP DRINKING ONCE YOU HAD STARTED: LESS THAN MONTHLY
HOW OFTEN DURING THE LAST YEAR HAVE YOU BEEN UNABLE TO REMEMBER WHAT HAPPENED THE NIGHT BEFORE BECAUSE YOU HAD BEEN DRINKING: LESS THAN MONTHLY
HOW MANY STANDARD DRINKS CONTAINING ALCOHOL DO YOU HAVE ON A TYPICAL DAY: 1
HOW OFTEN DURING THE LAST YEAR HAVE YOU BEEN UNABLE TO REMEMBER WHAT HAPPENED THE NIGHT BEFORE BECAUSE YOU HAD BEEN DRINKING: LESS THAN MONTHLY
HOW MANY STANDARD DRINKS CONTAINING ALCOHOL DO YOU HAVE ON A TYPICAL DAY: 1 OR 2
HOW OFTEN DURING THE LAST YEAR HAVE YOU HAD A FEELING OF GUILT OR REMORSE AFTER DRINKING: LESS THAN MONTHLY

## 2025-01-22 ASSESSMENT — PATIENT HEALTH QUESTIONNAIRE - PHQ9
1. LITTLE INTEREST OR PLEASURE IN DOING THINGS: NOT AT ALL
SUM OF ALL RESPONSES TO PHQ QUESTIONS 1-9: 0
SUM OF ALL RESPONSES TO PHQ QUESTIONS 1-9: 0
2. FEELING DOWN, DEPRESSED OR HOPELESS: NOT AT ALL
SUM OF ALL RESPONSES TO PHQ9 QUESTIONS 1 & 2: 0
SUM OF ALL RESPONSES TO PHQ QUESTIONS 1-9: 0
SUM OF ALL RESPONSES TO PHQ QUESTIONS 1-9: 0

## 2025-01-24 SDOH — ECONOMIC STABILITY: INCOME INSECURITY: IN THE LAST 12 MONTHS, WAS THERE A TIME WHEN YOU WERE NOT ABLE TO PAY THE MORTGAGE OR RENT ON TIME?: NO

## 2025-01-24 SDOH — ECONOMIC STABILITY: FOOD INSECURITY: WITHIN THE PAST 12 MONTHS, THE FOOD YOU BOUGHT JUST DIDN'T LAST AND YOU DIDN'T HAVE MONEY TO GET MORE.: NEVER TRUE

## 2025-01-24 SDOH — ECONOMIC STABILITY: TRANSPORTATION INSECURITY
IN THE PAST 12 MONTHS, HAS THE LACK OF TRANSPORTATION KEPT YOU FROM MEDICAL APPOINTMENTS OR FROM GETTING MEDICATIONS?: NO

## 2025-01-24 SDOH — ECONOMIC STABILITY: FOOD INSECURITY: WITHIN THE PAST 12 MONTHS, YOU WORRIED THAT YOUR FOOD WOULD RUN OUT BEFORE YOU GOT MONEY TO BUY MORE.: NEVER TRUE

## 2025-01-27 ENCOUNTER — OFFICE VISIT (OUTPATIENT)
Dept: FAMILY MEDICINE CLINIC | Age: 70
End: 2025-01-27

## 2025-01-27 VITALS
SYSTOLIC BLOOD PRESSURE: 130 MMHG | WEIGHT: 160 LBS | OXYGEN SATURATION: 97 % | HEART RATE: 85 BPM | HEIGHT: 64 IN | BODY MASS INDEX: 27.31 KG/M2 | DIASTOLIC BLOOD PRESSURE: 70 MMHG

## 2025-01-27 DIAGNOSIS — I10 PRIMARY HYPERTENSION: ICD-10-CM

## 2025-01-27 DIAGNOSIS — E83.110 HEREDITARY HEMOCHROMATOSIS (HCC): ICD-10-CM

## 2025-01-27 DIAGNOSIS — E78.2 MIXED HYPERLIPIDEMIA: ICD-10-CM

## 2025-01-27 DIAGNOSIS — R35.1 BENIGN PROSTATIC HYPERPLASIA WITH NOCTURIA: ICD-10-CM

## 2025-01-27 DIAGNOSIS — I25.10 CORONARY ARTERY DISEASE INVOLVING NATIVE CORONARY ARTERY OF NATIVE HEART WITHOUT ANGINA PECTORIS: ICD-10-CM

## 2025-01-27 DIAGNOSIS — Z00.00 MEDICARE ANNUAL WELLNESS VISIT, SUBSEQUENT: Primary | ICD-10-CM

## 2025-01-27 DIAGNOSIS — N40.1 BENIGN PROSTATIC HYPERPLASIA WITH NOCTURIA: ICD-10-CM

## 2025-01-27 SDOH — ECONOMIC STABILITY: FOOD INSECURITY: WITHIN THE PAST 12 MONTHS, THE FOOD YOU BOUGHT JUST DIDN'T LAST AND YOU DIDN'T HAVE MONEY TO GET MORE.: NEVER TRUE

## 2025-01-27 SDOH — ECONOMIC STABILITY: FOOD INSECURITY: WITHIN THE PAST 12 MONTHS, YOU WORRIED THAT YOUR FOOD WOULD RUN OUT BEFORE YOU GOT MONEY TO BUY MORE.: NEVER TRUE

## 2025-01-27 ASSESSMENT — PATIENT HEALTH QUESTIONNAIRE - PHQ9
SUM OF ALL RESPONSES TO PHQ9 QUESTIONS 1 & 2: 0
2. FEELING DOWN, DEPRESSED OR HOPELESS: NOT AT ALL
SUM OF ALL RESPONSES TO PHQ QUESTIONS 1-9: 0
1. LITTLE INTEREST OR PLEASURE IN DOING THINGS: NOT AT ALL
SUM OF ALL RESPONSES TO PHQ QUESTIONS 1-9: 0

## 2025-01-27 ASSESSMENT — LIFESTYLE VARIABLES
HOW OFTEN DURING THE LAST YEAR HAVE YOU BEEN UNABLE TO REMEMBER WHAT HAPPENED THE NIGHT BEFORE BECAUSE YOU HAD BEEN DRINKING: LESS THAN MONTHLY
HOW OFTEN DURING THE LAST YEAR HAVE YOU NEEDED AN ALCOHOLIC DRINK FIRST THING IN THE MORNING TO GET YOURSELF GOING AFTER A NIGHT OF HEAVY DRINKING: NEVER
HAS A RELATIVE, FRIEND, DOCTOR, OR ANOTHER HEALTH PROFESSIONAL EXPRESSED CONCERN ABOUT YOUR DRINKING OR SUGGESTED YOU CUT DOWN: NO
HOW MANY STANDARD DRINKS CONTAINING ALCOHOL DO YOU HAVE ON A TYPICAL DAY: 1 OR 2
HOW OFTEN DURING THE LAST YEAR HAVE YOU FOUND THAT YOU WERE NOT ABLE TO STOP DRINKING ONCE YOU HAD STARTED: LESS THAN MONTHLY
HOW OFTEN DURING THE LAST YEAR HAVE YOU HAD A FEELING OF GUILT OR REMORSE AFTER DRINKING: LESS THAN MONTHLY
HOW OFTEN DURING THE LAST YEAR HAVE YOU FAILED TO DO WHAT WAS NORMALLY EXPECTED FROM YOU BECAUSE OF DRINKING: NEVER
HOW OFTEN DO YOU HAVE A DRINK CONTAINING ALCOHOL: 2-3 TIMES A WEEK
HAVE YOU OR SOMEONE ELSE BEEN INJURED AS A RESULT OF YOUR DRINKING: NO

## 2025-01-27 NOTE — PROGRESS NOTES
Medicare Annual Wellness Visit    Billy Singleton is here for No chief complaint on file.    Assessment & Plan  1. Annual wellness visit.  His hypertension is well-managed, and he is not due for lipid panel until spring. He is not experiencing any chest pain or memory issues. He is currently taking Flomax daily for prostate, pravastatin once a day for cholesterol, losartan 100 mg daily for blood pressure, Norvasc 10 mg daily for blood pressure, and a baby aspirin once a day for heart health. He has nitroglycerin to take as needed but has never needed it. He uses ibuprofen as needed and has Voltaren gel, which he is not currently using. He does not need any refills at this time. He is advised to request a cholesterol panel during his next blood test with his hematologist.    2. Hereditary hemochromatosis.  He gets blood drawn periodically to manage this condition. His hematocrit levels are stable, and his liver function tests are within normal limits. He will have his ferritin levels tested during his next visit with his hematologist.    3. C4-C6 degenerative disc disease.  He has multilevel degenerative disc disease at C5-C6 and C6-C7, which is common for his age. He is not experiencing any pain in his arms, weakness, or chronic pain in his neck. He is advised to keep himself strong and avoid alcohol consumption.    PROCEDURE  The patient underwent angioplasty with stent placement, which is well-managed.  Medicare annual wellness visit, subsequent  Primary hypertension  Hereditary hemochromatosis (HCC)  Benign prostatic hyperplasia with nocturia  Mixed hyperlipidemia  Coronary artery disease involving native coronary artery of native heart without angina pectoris  Dental: up-to-date  Eye: up-to-date  Colonoscopy: up-to-date  Labs- Due this spring  Results  Laboratory Studies  Alcohol level was 0.4. Hematocrit is over 40. Liver functions were good. Ammonia levels were good.    Imaging  CT scan of cervical spine showed  English

## 2025-01-27 NOTE — PATIENT INSTRUCTIONS
9 Ways to Cut Back on Drinking  Maybe you've found yourself drinking more alcohol than you'd prefer. If you want to cut back, here are some ideas to try.    Think before you drink.  Do you really want a drink, or is it just a habit? If you're used to having a drink at a certain time, try doing something else then.     Look for substitutes.  Find some no-alcohol drinks that you enjoy, like flavored seltzer water, tea with honey, or tonic with a slice of lime. Or try alcohol-free beer or \"virgin\" cocktails (without the alcohol).     Drink more water.  Use water to quench your thirst. Drink a glass of water before you have any alcohol. Have another glass along with every drink or between drinks.     Shrink your drink.  For example, have a bottle of beer instead of a pint. Use a smaller glass for wine. Choose drinks with lower alcohol content (ABV%). Or use less liquor and more mixer in cocktails.     Slow down.  It's easy to drink quickly and without thinking about it. Pay attention, and make each drink last longer.     Do the math.  Total up how much you spend on alcohol each month. How much is that a year? If you cut back, what could you do with the money you save?     Take a break.  Choose a day or two each week when you won't drink at all. Notice how you feel on those days, physically and emotionally. How did you sleep? Do you feel better? Over time, add more break days.     Count calories.  Would you like to lose some weight? For some people that's a good motivator for cutting back. Figure out how many calories are in each drink. How many does that add up to in a day? In a week? In a month?     Practice saying no.  Be ready when someone offers you a drink. Try: \"Thanks, I've had enough.\" Or \"Thanks, but I'm cutting back.\" Or \"No, thanks. I feel better when I drink less.\"   Current as of: November 15, 2023  Content Version: 14.3  © 2024 [employer].   Care instructions adapted under license by [facility]

## 2025-03-30 DIAGNOSIS — E78.2 MIXED HYPERLIPIDEMIA: ICD-10-CM

## 2025-03-31 RX ORDER — PRAVASTATIN SODIUM 40 MG
40 TABLET ORAL DAILY
Qty: 90 TABLET | Refills: 0 | Status: SHIPPED | OUTPATIENT
Start: 2025-03-31

## 2025-04-02 DIAGNOSIS — I10 PRIMARY HYPERTENSION: ICD-10-CM

## 2025-04-02 RX ORDER — LOSARTAN POTASSIUM 100 MG/1
100 TABLET ORAL DAILY
Qty: 90 TABLET | Refills: 0 | Status: SHIPPED | OUTPATIENT
Start: 2025-04-02

## 2025-06-27 DIAGNOSIS — E78.2 MIXED HYPERLIPIDEMIA: ICD-10-CM

## 2025-06-27 RX ORDER — PRAVASTATIN SODIUM 40 MG
40 TABLET ORAL DAILY
Qty: 90 TABLET | Refills: 0 | Status: SHIPPED | OUTPATIENT
Start: 2025-06-27

## 2025-06-28 DIAGNOSIS — I10 PRIMARY HYPERTENSION: ICD-10-CM

## 2025-06-30 RX ORDER — LOSARTAN POTASSIUM 100 MG/1
100 TABLET ORAL DAILY
Qty: 90 TABLET | Refills: 0 | OUTPATIENT
Start: 2025-06-30

## 2025-07-14 ENCOUNTER — PATIENT MESSAGE (OUTPATIENT)
Dept: FAMILY MEDICINE CLINIC | Age: 70
End: 2025-07-14

## 2025-07-14 DIAGNOSIS — I10 PRIMARY HYPERTENSION: ICD-10-CM

## 2025-07-14 RX ORDER — LOSARTAN POTASSIUM 100 MG/1
100 TABLET ORAL DAILY
Qty: 90 TABLET | Refills: 0 | Status: SHIPPED | OUTPATIENT
Start: 2025-07-14

## 2025-07-14 NOTE — TELEPHONE ENCOUNTER
01/27/2025 LOV  07/29/2025 Scheduled FOV    Requested Prescriptions     Pending Prescriptions Disp Refills    losartan (COZAAR) 100 MG tablet 90 tablet 0     Sig: Take 1 tablet by mouth daily

## 2025-07-29 ENCOUNTER — OFFICE VISIT (OUTPATIENT)
Dept: FAMILY MEDICINE CLINIC | Age: 70
End: 2025-07-29
Payer: MEDICARE

## 2025-07-29 VITALS
HEART RATE: 85 BPM | OXYGEN SATURATION: 98 % | SYSTOLIC BLOOD PRESSURE: 142 MMHG | WEIGHT: 165 LBS | HEIGHT: 64 IN | BODY MASS INDEX: 28.17 KG/M2 | TEMPERATURE: 97.1 F | DIASTOLIC BLOOD PRESSURE: 62 MMHG

## 2025-07-29 DIAGNOSIS — I10 PRIMARY HYPERTENSION: Primary | ICD-10-CM

## 2025-07-29 DIAGNOSIS — Z12.11 SCREEN FOR COLON CANCER: ICD-10-CM

## 2025-07-29 DIAGNOSIS — E78.2 MIXED HYPERLIPIDEMIA: ICD-10-CM

## 2025-07-29 DIAGNOSIS — R42 DIZZINESS AFTER EXTENSION OF NECK: ICD-10-CM

## 2025-07-29 DIAGNOSIS — M51.369 DEGENERATION OF INTERVERTEBRAL DISC OF LUMBAR REGION, UNSPECIFIED WHETHER PAIN PRESENT: ICD-10-CM

## 2025-07-29 DIAGNOSIS — H81.10 BENIGN PAROXYSMAL POSITIONAL VERTIGO, UNSPECIFIED LATERALITY: ICD-10-CM

## 2025-07-29 LAB
CHOLEST SERPL-MCNC: 160 MG/DL (ref 0–199)
HDLC SERPL-MCNC: 78 MG/DL (ref 40–60)
LDLC SERPL CALC-MCNC: 63 MG/DL
TRIGL SERPL-MCNC: 94 MG/DL (ref 0–150)
VLDLC SERPL CALC-MCNC: 19 MG/DL

## 2025-07-29 PROCEDURE — 3077F SYST BP >= 140 MM HG: CPT | Performed by: NURSE PRACTITIONER

## 2025-07-29 PROCEDURE — 1123F ACP DISCUSS/DSCN MKR DOCD: CPT | Performed by: NURSE PRACTITIONER

## 2025-07-29 PROCEDURE — 1036F TOBACCO NON-USER: CPT | Performed by: NURSE PRACTITIONER

## 2025-07-29 PROCEDURE — G2211 COMPLEX E/M VISIT ADD ON: HCPCS | Performed by: NURSE PRACTITIONER

## 2025-07-29 PROCEDURE — G8427 DOCREV CUR MEDS BY ELIG CLIN: HCPCS | Performed by: NURSE PRACTITIONER

## 2025-07-29 PROCEDURE — 3078F DIAST BP <80 MM HG: CPT | Performed by: NURSE PRACTITIONER

## 2025-07-29 PROCEDURE — 3017F COLORECTAL CA SCREEN DOC REV: CPT | Performed by: NURSE PRACTITIONER

## 2025-07-29 PROCEDURE — 99214 OFFICE O/P EST MOD 30 MIN: CPT | Performed by: NURSE PRACTITIONER

## 2025-07-29 PROCEDURE — G8417 CALC BMI ABV UP PARAM F/U: HCPCS | Performed by: NURSE PRACTITIONER

## 2025-07-29 PROCEDURE — 1159F MED LIST DOCD IN RCRD: CPT | Performed by: NURSE PRACTITIONER

## 2025-07-29 NOTE — ASSESSMENT & PLAN NOTE
Chronic, at goal (stable), patient reports episode where right leg gave out while golfing at end of golf swing (patient unable to keep both hands on golf club; rather only keeping left hand on club at end of swing), patient encouraged to reach out to office with any concerns or worsening of symptoms    [facility]    MRI LUMBAR SPINE WO CONTRAST; Future

## 2025-07-29 NOTE — PROGRESS NOTES
Billy Singleton (:  1955) is a 69 y.o. male,Established patient, here for evaluation of the following chief complaint(s):  6 Month Follow-Up (6 mon f/u, wants to discuss issues he is having with bulging disc/leg weakness. /Pt also states he has had a weak belly over the past 6 months, as well as balance issues.)         Assessment & Plan  Primary hypertension   Chronic, at goal (stable), continue current treatment plan         Benign paroxysmal positional vertigo, unspecified laterality   Chronic, not at goal (unstable), with patient report of vertigo with extension/flexion of the neck and prior history, carotid duplex ordered.     Orders:    Vascular duplex carotid bilateral; Future    Mixed hyperlipidemia   Chronic, at goal (stable), continue current treatment plan    Orders:    LIPID PANEL; Future    Degeneration of intervertebral disc of lumbar region, unspecified whether pain present   Chronic, at goal (stable), patient reports episode where right leg gave out while golfing at end of golf swing (patient unable to keep both hands on golf club; rather only keeping left hand on club at end of swing), patient encouraged to reach out to office with any concerns or worsening of symptoms    Orders:    [facility] ([facility])- [facility]    MRI LUMBAR SPINE WO CONTRAST; Future    Dizziness after extension of neck   Chronic, not at goal (unstable), patient reports exacerbation in instances such as looking down while golfing, \"if I take too long setting up my swing I get dizzy for about 5-10 seconds.     Orders:    Vascular duplex carotid bilateral; Future    Screen for colon cancer   Patient inquiring about colon screening. 10 years since last screening    Orders:    Ceferino Landry MD, Gastroenterology ([facility]), [place]      No follow-ups on file.       Subjective   Patient is a 69 year old male with history of DDD lumbar, HTN, HLD who presents to the office today

## 2025-08-08 ENCOUNTER — HOSPITAL ENCOUNTER (OUTPATIENT)
Dept: MRI IMAGING | Age: 70
Discharge: HOME | End: 2025-08-08
Payer: MEDICARE

## 2025-08-08 ENCOUNTER — HOSPITAL ENCOUNTER (OUTPATIENT)
Dept: VASCULAR LAB | Age: 70
Discharge: HOME | End: 2025-08-10
Payer: MEDICARE

## 2025-08-08 DIAGNOSIS — M51.369 DEGENERATION OF INTERVERTEBRAL DISC OF LUMBAR REGION, UNSPECIFIED WHETHER PAIN PRESENT: ICD-10-CM

## 2025-08-08 DIAGNOSIS — H81.10 BENIGN PAROXYSMAL POSITIONAL VERTIGO, UNSPECIFIED LATERALITY: ICD-10-CM

## 2025-08-08 DIAGNOSIS — R42 DIZZINESS AFTER EXTENSION OF NECK: ICD-10-CM

## 2025-08-08 LAB
VAS LEFT ARM BP DIA: 80 MMHG
VAS LEFT ARM BP: 132 MMHG
VAS LEFT CCA DIST EDV: 19.9 CM/S
VAS LEFT CCA DIST PSV: 89.1 CM/S
VAS LEFT CCA MID EDV: 26.1 CM/S
VAS LEFT CCA MID PSV: 110 CM/S
VAS LEFT CCA PROX EDV: 30.3 CM/S
VAS LEFT CCA PROX PSV: 134 CM/S
VAS LEFT ECA EDV: 12.3 CM/S
VAS LEFT ECA PSV: 96.5 CM/S
VAS LEFT ICA DIST EDV: 27 CM/S
VAS LEFT ICA DIST PSV: 84.4 CM/S
VAS LEFT ICA MID EDV: 26.5 CM/S
VAS LEFT ICA MID PSV: 73.9 CM/S
VAS LEFT ICA PROX EDV: 26.5 CM/S
VAS LEFT ICA PROX PSV: 91.6 CM/S
VAS LEFT ICA/CCA PSV: 0.83
VAS LEFT SUBCLAVIAN PROX EDV: 0 CM/S
VAS LEFT SUBCLAVIAN PROX PSV: 83.4 CM/S
VAS LEFT VERTEBRAL EDV: 11.6 CM/S
VAS LEFT VERTEBRAL PSV: 36.4 CM/S
VAS RIGHT ARM BP DIA: 80 MMHG
VAS RIGHT ARM BP: 142 MMHG
VAS RIGHT CCA DIST EDV: 17 CM/S
VAS RIGHT CCA DIST PSV: 83.6 CM/S
VAS RIGHT CCA MID EDV: 20.4 CM/S
VAS RIGHT CCA MID PSV: 91.1 CM/S
VAS RIGHT CCA PROX EDV: 17.7 CM/S
VAS RIGHT CCA PROX PSV: 99.2 CM/S
VAS RIGHT ECA EDV: 13.6 CM/S
VAS RIGHT ECA PSV: 75.4 CM/S
VAS RIGHT ICA DIST EDV: 23.1 CM/S
VAS RIGHT ICA DIST PSV: 81.5 CM/S
VAS RIGHT ICA MID EDV: 28.4 CM/S
VAS RIGHT ICA MID PSV: 73.6 CM/S
VAS RIGHT ICA PROX EDV: 18.3 CM/S
VAS RIGHT ICA PROX PSV: 62.5 CM/S
VAS RIGHT ICA/CCA PSV: 0.69
VAS RIGHT SUBCLAVIAN PROX EDV: 0 CM/S
VAS RIGHT SUBCLAVIAN PROX PSV: 75.7 CM/S
VAS RIGHT VERTEBRAL EDV: 24.1 CM/S
VAS RIGHT VERTEBRAL PSV: 70.8 CM/S

## 2025-08-08 PROCEDURE — 93880 EXTRACRANIAL BILAT STUDY: CPT

## 2025-08-08 PROCEDURE — 93880 EXTRACRANIAL BILAT STUDY: CPT | Performed by: SURGERY

## 2025-08-08 PROCEDURE — 72148 MRI LUMBAR SPINE W/O DYE: CPT

## 2025-08-18 ENCOUNTER — HOSPITAL ENCOUNTER (OUTPATIENT)
Dept: PHYSICAL THERAPY | Age: 70
Setting detail: THERAPIES SERIES
Discharge: HOME | End: 2025-08-18
Payer: MEDICARE

## 2025-08-18 PROCEDURE — 97110 THERAPEUTIC EXERCISES: CPT

## 2025-08-18 PROCEDURE — 97161 PT EVAL LOW COMPLEX 20 MIN: CPT

## 2025-08-27 ENCOUNTER — HOSPITAL ENCOUNTER (OUTPATIENT)
Dept: PHYSICAL THERAPY | Age: 70
Setting detail: THERAPIES SERIES
Discharge: HOME | End: 2025-08-27
Payer: MEDICARE

## 2025-08-27 PROCEDURE — 97110 THERAPEUTIC EXERCISES: CPT | Performed by: SPECIALIST/TECHNOLOGIST

## 2025-08-27 PROCEDURE — 97140 MANUAL THERAPY 1/> REGIONS: CPT | Performed by: SPECIALIST/TECHNOLOGIST

## 2025-09-03 ENCOUNTER — HOSPITAL ENCOUNTER (OUTPATIENT)
Dept: PHYSICAL THERAPY | Age: 70
Setting detail: THERAPIES SERIES
Discharge: HOME | End: 2025-09-03
Payer: MEDICARE

## 2025-09-03 PROCEDURE — 97140 MANUAL THERAPY 1/> REGIONS: CPT | Performed by: SPECIALIST/TECHNOLOGIST

## 2025-09-03 PROCEDURE — 97110 THERAPEUTIC EXERCISES: CPT | Performed by: SPECIALIST/TECHNOLOGIST
